# Patient Record
Sex: MALE | Race: WHITE | NOT HISPANIC OR LATINO | Employment: FULL TIME | URBAN - METROPOLITAN AREA
[De-identification: names, ages, dates, MRNs, and addresses within clinical notes are randomized per-mention and may not be internally consistent; named-entity substitution may affect disease eponyms.]

---

## 2018-09-11 LAB — HCV AB SER-ACNC: NONREACTIVE

## 2021-01-14 ENCOUNTER — OFFICE VISIT (OUTPATIENT)
Dept: FAMILY MEDICINE CLINIC | Facility: CLINIC | Age: 51
End: 2021-01-14
Payer: COMMERCIAL

## 2021-01-14 VITALS
HEIGHT: 72 IN | DIASTOLIC BLOOD PRESSURE: 88 MMHG | OXYGEN SATURATION: 98 % | WEIGHT: 253.4 LBS | BODY MASS INDEX: 34.32 KG/M2 | SYSTOLIC BLOOD PRESSURE: 120 MMHG | HEART RATE: 104 BPM | TEMPERATURE: 98.4 F

## 2021-01-14 DIAGNOSIS — Z11.4 ENCOUNTER FOR SCREENING FOR HIV: ICD-10-CM

## 2021-01-14 DIAGNOSIS — Z00.00 ENCOUNTER FOR WELL ADULT EXAM WITHOUT ABNORMAL FINDINGS: Primary | ICD-10-CM

## 2021-01-14 DIAGNOSIS — Z83.3 FAMILY HISTORY OF DIABETES MELLITUS: ICD-10-CM

## 2021-01-14 PROBLEM — M16.11 OSTEOARTHRITIS OF RIGHT HIP: Status: ACTIVE | Noted: 2021-01-14

## 2021-01-14 PROBLEM — B07.9 VERRUCA VULGARIS: Status: ACTIVE | Noted: 2021-01-14

## 2021-01-14 PROCEDURE — 3725F SCREEN DEPRESSION PERFORMED: CPT | Performed by: NURSE PRACTITIONER

## 2021-01-14 PROCEDURE — 3008F BODY MASS INDEX DOCD: CPT | Performed by: NURSE PRACTITIONER

## 2021-01-14 PROCEDURE — 4004F PT TOBACCO SCREEN RCVD TLK: CPT | Performed by: NURSE PRACTITIONER

## 2021-01-14 PROCEDURE — 99386 PREV VISIT NEW AGE 40-64: CPT | Performed by: NURSE PRACTITIONER

## 2021-01-14 RX ORDER — CELECOXIB 200 MG/1
200 CAPSULE ORAL 2 TIMES DAILY
COMMUNITY

## 2021-01-14 NOTE — PROGRESS NOTES
BMI Counseling: Body mass index is 34 37 kg/m²  The BMI is above normal  Nutrition recommendations include reducing portion sizes  Exercise recommendations include moderate aerobic physical activity for 150 minutes/week  ADULT ANNUAL [address] [facility]    NAME: Lashon Rosas  AGE: 52 y o  SEX: male  : 1970     DATE: 2021     Assessment and Plan:     Problem List Items Addressed This Visit        Other    Family history of diabetes mellitus    Relevant Orders    Hemoglobin A1C      Other Visit Diagnoses     Encounter for well adult exam without abnormal findings    -  Primary    Relevant Orders    Hemoglobin A1C    CBC and differential    Comprehensive metabolic panel    TSH, 3rd generation with Free T4 reflex    Lipid Panel with Direct LDL reflex    Encounter for screening for HIV        Relevant Orders    HIV 1/2 Antigen/Antibody (4th Generation) w Reflex [facility]          Immunizations and preventive care screenings were discussed with patient today  Appropriate education was printed on patient's after visit summary  Counseling:  Alcohol/drug use: discussed moderation in alcohol intake, the recommendations for healthy alcohol use, and avoidance of illicit drug use  Dental Health: discussed importance of regular tooth brushing, flossing, and dental visits  Injury prevention: discussed safety/seat belts, safety helmets, smoke detectors, carbon dioxide detectors, and smoking near bedding or upholstery  Sexual health: discussed sexually transmitted diseases, partner selection, use of condoms, avoidance of unintended pregnancy, and contraceptive alternatives  · Exercise: the importance of regular exercise/physical activity was discussed  Recommend exercise 3-5 times per week for at least 30 minutes  BMI Counseling: Body mass index is 34 37 kg/m²  The BMI is above normal  Nutrition recommendations include decreasing portion sizes  Exercise recommendations include moderate physical activity 150 minutes/week  Tobacco Cessation Counseling: Tobacco cessation counseling was provided  The patient is sincerely urged to quit consumption of tobacco  He is not ready to quit tobacco  occasional pipe       Return in about 1 year (around 1/14/2022)  Chief Complaint:     Chief Complaint   Patient presents with   [address]     pt is in office to est care    Annual Exam     pt would like annual exam      History of Present Illness:     Adult Annual Physical   Patient here for a comprehensive physical exam  The patient reports no problems  Diet and Physical Activity  · Diet/Nutrition: well balanced diet and avoids sweets, does eat larger portions at time   · Exercise: walking and 30minutes in am, does some weights twice weekly  Depression Screening  PHQ-9 Depression Screening    PHQ-9:   Frequency of the following problems over the past two weeks:      Little interest or pleasure in doing things: 0 - not at all  Feeling down, depressed, or hopeless: 0 - not at all  PHQ-2 Score: 0       General Health  · Sleep: sleeps well  · Hearing: normal - bilateral   · Vision: wears glasses  · Dental: every 6 months     Health  · Symptoms include: none     Review of Systems:     Review of Systems   Constitutional: Negative for activity change, chills, fatigue and fever  HENT: Negative for congestion, ear discharge, ear pain, sinus pressure, sinus pain, sore throat, tinnitus and trouble swallowing  Eyes: Negative for photophobia, pain, discharge, itching and visual disturbance  Respiratory: Negative for cough, chest tightness, shortness of breath and wheezing  Cardiovascular: Negative for chest pain and leg swelling  Gastrointestinal: Negative for abdominal distention, abdominal pain, constipation, diarrhea, nausea and vomiting  Endocrine: Negative for polydipsia, polyphagia and polyuria     Genitourinary: Negative for dysuria and frequency  Musculoskeletal: Negative for arthralgias, myalgias, neck pain and neck stiffness  Skin: Negative for color change  Neurological: Negative for dizziness, syncope, weakness, numbness and headaches  Hematological: Does not bruise/bleed easily  Psychiatric/Behavioral: Negative for behavioral problems, confusion, self-injury, sleep disturbance and suicidal ideas  The patient is not nervous/anxious         Past Medical History:     Past Medical History:   Diagnosis Date    Arthritis       Past Surgical History:     Past Surgical History:   Procedure Laterality Date    HIP SURGERY  2014      Family History:     Family History   Problem Relation Age of Onset    No Known Problems Mother     Hypertension Father     Asthma Father     Diabetes Father     Alzheimer's disease Father       Social History:     E-Cigarette/Vaping    E-Cigarette Use Never User      E-Cigarette/Vaping Substances    Nicotine No     THC No     CBD No     Flavoring No     Other No     Unknown No      Social History     Socioeconomic History    Marital status: Single     Spouse name: None    Number of children: None    Years of education: None    Highest education level: None   Occupational History    None   Social Needs    Financial resource strain: None    Food insecurity     Worry: None     Inability: None    Transportation needs     Medical: None     Non-medical: None   Tobacco Use    Smoking status: Current Some Day Smoker     Types: Pipe    Smokeless tobacco: Never Used   Substance and Sexual Activity    Alcohol use: Yes     Frequency: Monthly or less    Drug use: Never    Sexual activity: Yes   Lifestyle    Physical activity     Days per week: None     Minutes per session: None    Stress: None   Relationships    Social connections     Talks on phone: None     Gets together: None     Attends [employer] service: None     Active member of club or organization: None     Attends meetings of clubs or organizations: None     Relationship status: None    Intimate partner violence     Fear of current or ex partner: None     Emotionally abused: None     Physically abused: None     Forced sexual activity: None   Other Topics Concern    None   Social History Narrative    None      Current Medications:     Current Outpatient Medications   Medication Sig Dispense Refill    celecoxib (CeleBREX) 200 mg capsule Take 200 mg by mouth 2 (two) times a day       No current facility-administered medications for this visit  Allergies:     No Known Allergies   Physical Exam:     /88 (BP Location: Left arm, Patient Position: Sitting)   Pulse 104   Temp 98 4 °F (36 9 °C)   Ht 6' (1 829 m)   Wt 115 kg (253 lb 6 4 oz)   SpO2 98%   BMI 34 37 kg/m²     Physical Exam  Constitutional:       Appearance: Normal appearance  He is well-developed  HENT:      Head: Normocephalic and atraumatic  Right Ear: Tympanic membrane, ear canal and external ear normal       Left Ear: Tympanic membrane, ear canal and external ear normal       Nose: Nose normal  No congestion or rhinorrhea  Mouth/Throat:      Mouth: Mucous membranes are moist       Pharynx: No oropharyngeal exudate or posterior oropharyngeal erythema  Eyes:      General:         Right eye: No discharge  Left eye: No discharge  Conjunctiva/sclera: Conjunctivae normal       Pupils: Pupils are equal, round, and reactive to light  Neck:      Musculoskeletal: Normal range of motion and neck supple  Thyroid: No thyromegaly  Cardiovascular:      Rate and Rhythm: Normal rate and regular rhythm  Pulses: Normal pulses  Heart sounds: Normal heart sounds  Pulmonary:      Effort: Pulmonary effort is normal       Breath sounds: Normal breath sounds  No wheezing or rhonchi  Abdominal:      General: Bowel sounds are normal  There is no distension  Palpations: Abdomen is soft  Tenderness: There is no abdominal tenderness  Musculoskeletal: Normal range of motion  General: No swelling, tenderness or deformity  Right lower leg: No edema  Left lower leg: No edema  Skin:     General: Skin is warm and dry  Findings: No erythema or rash  Neurological:      General: No focal deficit present  Mental Status: He is alert and oriented to person, place, and time  Psychiatric:         Mood and Affect: Mood normal          Behavior: Behavior normal          Thought Content:  Thought content normal          Judgment: Judgment normal           Isabelle Carey, [address] [place] [address]

## 2021-01-18 LAB
ALBUMIN SERPL-MCNC: 4.3 G/DL (ref 4–5)
ALBUMIN/GLOB SERPL: 2 {RATIO} (ref 1.2–2.2)
ALP SERPL-CCNC: 51 IU/L (ref 39–117)
ALT SERPL-CCNC: 22 IU/L (ref 0–44)
AST SERPL-CCNC: 20 IU/L (ref 0–40)
BASOPHILS # BLD AUTO: 0.1 X10E3/UL (ref 0–0.2)
BASOPHILS NFR BLD AUTO: 1 %
BILIRUB SERPL-MCNC: 0.5 MG/DL (ref 0–1.2)
BUN SERPL-MCNC: 17 MG/DL (ref 6–24)
BUN/CREAT SERPL: 17 (ref 9–20)
CALCIUM SERPL-MCNC: 9.4 MG/DL (ref 8.7–10.2)
CHLORIDE SERPL-SCNC: 101 MMOL/L (ref 96–106)
CHOLEST SERPL-MCNC: 185 MG/DL (ref 100–199)
CO2 SERPL-SCNC: 26 MMOL/L (ref 20–29)
CREAT SERPL-MCNC: 0.99 MG/DL (ref 0.76–1.27)
EOSINOPHIL # BLD AUTO: 0.1 X10E3/UL (ref 0–0.4)
EOSINOPHIL NFR BLD AUTO: 2 %
ERYTHROCYTE [DISTWIDTH] IN BLOOD BY AUTOMATED COUNT: 11.6 % (ref 11.6–15.4)
GLOBULIN SER-MCNC: 2.2 G/DL (ref 1.5–4.5)
GLUCOSE SERPL-MCNC: 86 MG/DL (ref 65–99)
HBA1C MFR BLD: 5 % (ref 4.8–5.6)
HCT VFR BLD AUTO: 43.2 % (ref 37.5–51)
HDLC SERPL-MCNC: 54 MG/DL
HGB BLD-MCNC: 14.9 G/DL (ref 13–17.7)
HIV 1+2 AB+HIV1 P24 AG SERPL QL IA: NON REACTIVE
IMM GRANULOCYTES # BLD: 0 X10E3/UL (ref 0–0.1)
IMM GRANULOCYTES NFR BLD: 0 %
LDLC SERPL CALC-MCNC: 112 MG/DL (ref 0–99)
LYMPHOCYTES # BLD AUTO: 1.8 X10E3/UL (ref 0.7–3.1)
LYMPHOCYTES NFR BLD AUTO: 26 %
MCH RBC QN AUTO: 31.3 PG (ref 26.6–33)
MCHC RBC AUTO-ENTMCNC: 34.5 G/DL (ref 31.5–35.7)
MCV RBC AUTO: 91 FL (ref 79–97)
MONOCYTES # BLD AUTO: 0.7 X10E3/UL (ref 0.1–0.9)
MONOCYTES NFR BLD AUTO: 10 %
NEUTROPHILS # BLD AUTO: 4.2 X10E3/UL (ref 1.4–7)
NEUTROPHILS NFR BLD AUTO: 61 %
PLATELET # BLD AUTO: 266 X10E3/UL (ref 150–450)
POTASSIUM SERPL-SCNC: 4.5 MMOL/L (ref 3.5–5.2)
PROT SERPL-MCNC: 6.5 G/DL (ref 6–8.5)
RBC # BLD AUTO: 4.76 X10E6/UL (ref 4.14–5.8)
SL AMB EGFR AFRICAN AMERICAN: 104 ML/MIN/1.73
SL AMB EGFR NON AFRICAN AMERICAN: 90 ML/MIN/1.73
SL AMB VLDL CHOLESTEROL CALC: 19 MG/DL (ref 5–40)
SODIUM SERPL-SCNC: 139 MMOL/L (ref 134–144)
TRIGL SERPL-MCNC: 103 MG/DL (ref 0–149)
TSH SERPL DL<=0.005 MIU/L-ACNC: 1.45 UIU/ML (ref 0.45–4.5)
WBC # BLD AUTO: 6.9 X10E3/UL (ref 3.4–10.8)

## 2021-03-12 ENCOUNTER — TELEPHONE (OUTPATIENT)
Dept: DERMATOLOGY | Facility: CLINIC | Age: 51
End: 2021-03-12

## 2022-05-24 ENCOUNTER — OFFICE VISIT (OUTPATIENT)
Dept: FAMILY MEDICINE CLINIC | Facility: CLINIC | Age: 52
End: 2022-05-24
Payer: COMMERCIAL

## 2022-05-24 VITALS
BODY MASS INDEX: 32.53 KG/M2 | DIASTOLIC BLOOD PRESSURE: 80 MMHG | SYSTOLIC BLOOD PRESSURE: 138 MMHG | RESPIRATION RATE: 18 BRPM | TEMPERATURE: 99.4 F | HEIGHT: 72 IN | HEART RATE: 120 BPM | OXYGEN SATURATION: 97 % | WEIGHT: 240.2 LBS

## 2022-05-24 DIAGNOSIS — Z12.11 SCREEN FOR COLON CANCER: ICD-10-CM

## 2022-05-24 DIAGNOSIS — J20.8 ACUTE BACTERIAL BRONCHITIS: ICD-10-CM

## 2022-05-24 DIAGNOSIS — Z00.01 ENCOUNTER FOR WELL ADULT EXAM WITH ABNORMAL FINDINGS: Primary | ICD-10-CM

## 2022-05-24 DIAGNOSIS — Z12.5 SCREENING PSA (PROSTATE SPECIFIC ANTIGEN): ICD-10-CM

## 2022-05-24 DIAGNOSIS — B96.89 ACUTE BACTERIAL BRONCHITIS: ICD-10-CM

## 2022-05-24 PROCEDURE — 99396 PREV VISIT EST AGE 40-64: CPT | Performed by: NURSE PRACTITIONER

## 2022-05-24 PROCEDURE — 4004F PT TOBACCO SCREEN RCVD TLK: CPT | Performed by: NURSE PRACTITIONER

## 2022-05-24 PROCEDURE — 3725F SCREEN DEPRESSION PERFORMED: CPT | Performed by: NURSE PRACTITIONER

## 2022-05-24 PROCEDURE — 3008F BODY MASS INDEX DOCD: CPT | Performed by: NURSE PRACTITIONER

## 2022-05-24 RX ORDER — AZITHROMYCIN 250 MG/1
TABLET, FILM COATED ORAL
Qty: 6 TABLET | Refills: 0 | Status: SHIPPED | OUTPATIENT
Start: 2022-05-24 | End: 2022-05-28

## 2022-05-24 NOTE — PROGRESS NOTES
ADULT ANNUAL [address] [facility] PR[facility]    NAME: Elif Baumann  AGE: 54 y o  SEX: male  : 1970     DATE: 2022     Assessment and Plan:     Problem List Items Addressed This Visit    None     Visit Diagnoses     Encounter for well adult exam with abnormal findings    -  Primary    Relevant Orders    CBC and differential    Comprehensive metabolic panel    Lipid Panel with Direct LDL reflex    Screen for colon cancer        Relevant Orders    Cologuard    Screening PSA (prostate specific antigen)        Relevant Orders    PSA, total and free    Acute bacterial bronchitis        Relevant Medications    azithromycin (ZITHROMAX) 250 mg tablet          Immunizations and preventive care screenings were discussed with patient today  Appropriate education was printed on patient's after visit summary  Counseling:  Alcohol/drug use: discussed moderation in alcohol intake, the recommendations for healthy alcohol use, and avoidance of illicit drug use  Dental Health: discussed importance of regular tooth brushing, flossing, and dental visits  Injury prevention: discussed safety/seat belts, safety helmets, smoke detectors, carbon dioxide detectors, and smoking near bedding or upholstery  Sexual health: discussed sexually transmitted diseases, partner selection, use of condoms, avoidance of unintended pregnancy, and contraceptive alternatives  · Exercise: the importance of regular exercise/physical activity was discussed  Recommend exercise 3-5 times per week for at least 30 minutes  BMI Counseling: Body mass index is 32 58 kg/m²  The BMI is above normal  Nutrition recommendations include decreasing portion sizes  Exercise recommendations include moderate physical activity 150 minutes/week  Rationale for BMI follow-up plan is due to patient being overweight or obese       Depression Screening and Follow-up Plan: Patient was screened for depression during today's encounter  They screened negative with a PHQ-2 score of 0  Return in about 1 year (around 5/24/2023) for Annual physical      Chief Complaint:     Chief Complaint   Patient presents with    Cough     Negative 2 at home test , son had positive test        History of Present Illness:     Adult Annual Physical   Patient here for a comprehensive physical exam  The patient reports problems - chest cold  He reports a chest cold, he reports drainage, drip  He reports clearing his throat, cough at times  He had taken two covid tests and both negative and is vaccinated with a booster  He is taking Claritin D  Diet and Physical Activity  · Diet/Nutrition: well balanced diet  · Exercise: walking  Depression Screening  PHQ-2/9 Depression Screening    Little interest or pleasure in doing things: 0 - not at all  Feeling down, depressed, or hopeless: 0 - not at all  PHQ-2 Score: 0  PHQ-2 Interpretation: Negative depression screen       General Health  · Sleep: gets 7-8 hours of sleep on average  · Hearing: normal - bilateral   · Vision: wears glasses  · Dental: regular dental visits   Health  · Symptoms include: none     Review of Systems:     Review of Systems   Constitutional: Negative for activity change, chills, fatigue and fever  HENT: Positive for postnasal drip  Negative for congestion, ear discharge, ear pain, sinus pressure, sinus pain, sore throat, tinnitus and trouble swallowing  Eyes: Negative for photophobia, pain, discharge, itching and visual disturbance  Respiratory: Positive for cough  Negative for chest tightness, shortness of breath and wheezing  Cardiovascular: Negative for chest pain and leg swelling  Gastrointestinal: Negative for abdominal distention, abdominal pain, constipation, diarrhea, nausea and vomiting  Endocrine: Negative for polydipsia, polyphagia and polyuria  Genitourinary: Negative for dysuria and frequency     Musculoskeletal: Negative for arthralgias, myalgias, neck pain and neck stiffness  Skin: Negative for color change  Neurological: Negative for dizziness, syncope, weakness, numbness and headaches  Hematological: Does not bruise/bleed easily  Psychiatric/Behavioral: Negative for behavioral problems, confusion, self-injury, sleep disturbance and suicidal ideas  The patient is not nervous/anxious  Past Medical History:     Past Medical History:   Diagnosis Date    Arthritis       Past Surgical History:     Past Surgical History:   Procedure Laterality Date    HIP SURGERY  2014      Family History:     Family History   Problem Relation Age of Onset    No Known Problems Mother     Hypertension Father     Asthma Father     Diabetes Father     Alzheimer's disease Father       Social History:     Social History     Socioeconomic History    Marital status: Single     Spouse name: None    Number of children: None    Years of education: None    Highest education level: None   Occupational History    None   Tobacco Use    Smoking status: Current Some Day Smoker     Types: Pipe    Smokeless tobacco: Never Used   Vaping Use    Vaping Use: Never used   Substance and Sexual Activity    Alcohol use:  Yes    Drug use: Never    Sexual activity: Yes   Other Topics Concern    None   Social History Narrative    None     Social Determinants of Health     Financial Resource Strain: Not on file   Food Insecurity: Not on file   Transportation Needs: Not on file   Physical Activity: Not on file   Stress: Not on file   Social Connections: Not on file   Intimate Partner Violence: Not on file   Housing Stability: Not on file      Current Medications:     Current Outpatient Medications   Medication Sig Dispense Refill    azithromycin (ZITHROMAX) 250 mg tablet Take 2 tablets today then 1 tablet daily x 4 days 6 tablet 0    celecoxib (CeleBREX) 200 mg capsule Take 200 mg by mouth 2 (two) times a day       No current facility-administered medications for this visit  Allergies:     No Known Allergies   Physical Exam:     /80 (BP Location: Left arm, Patient Position: Sitting)   Pulse (!) 120   Temp 99 4 °F (37 4 °C)   Resp 18   Ht 6' (1 829 m)   Wt 109 kg (240 lb 3 2 oz)   SpO2 97%   BMI 32 58 kg/m²     Physical Exam  Vitals and nursing note reviewed  Constitutional:       Appearance: He is obese  HENT:      Head: Normocephalic and atraumatic  Nose: Nose normal    Cardiovascular:      Rate and Rhythm: Normal rate and regular rhythm  Pulses: Normal pulses  Heart sounds: Normal heart sounds  Pulmonary:      Effort: Pulmonary effort is normal       Breath sounds: Normal breath sounds  Neurological:      General: No focal deficit present  Mental Status: He is alert and oriented to person, place, and time  Psychiatric:         Mood and Affect: Mood normal          Behavior: Behavior normal           YANELY Matute  [facility] Hospital[facility] Counseling: Body mass index is 32 58 kg/m²  The BMI is above normal  Nutrition recommendations include reducing portion sizes  Exercise recommendations include moderate aerobic physical activity for 150 minutes/week

## 2022-06-07 LAB — COLOGUARD RESULT REPORTABLE: POSITIVE

## 2022-06-08 DIAGNOSIS — R19.5 POSITIVE COLORECTAL CANCER SCREENING USING COLOGUARD TEST: Primary | ICD-10-CM

## 2022-06-08 NOTE — RESULT ENCOUNTER NOTE
Pt has a positive cologuard, a referral for GI was placed, please make sure he is notified and an appt is captured

## 2022-06-27 ENCOUNTER — TELEPHONE (OUTPATIENT)
Dept: GASTROENTEROLOGY | Facility: CLINIC | Age: 52
End: 2022-06-27

## 2022-06-27 ENCOUNTER — PREP FOR PROCEDURE (OUTPATIENT)
Dept: GASTROENTEROLOGY | Facility: CLINIC | Age: 52
End: 2022-06-27

## 2022-06-27 DIAGNOSIS — R19.5 POSITIVE COLORECTAL CANCER SCREENING USING COLOGUARD TEST: Primary | ICD-10-CM

## 2022-06-27 NOTE — TELEPHONE ENCOUNTER
06/27/22  Screened by: Cristina Bourgeois    Referring Provider Veronica Salter    Pre- Screening: There is no height or weight on file to calculate BMI  Has patient been referred for a routine screening Colonoscopy? yes  Is the patient between 39-70 years old? yes      Previous Colonoscopy no   If yes:    Date:     Facility:     Reason:       SCHEDULING STAFF: If the patient is between 45yrs-49yrs, please advise patient to confirm benefits/coverage with their insurance company for a routine screening colonoscopy, some insurance carriers will only cover at [address] or older  If the patient is over 66years old, please schedule an office visit  Does the patient want to see a Gastroenterologist prior to their procedure OR are they having any GI symptoms? no    Has the patient been hospitalized or had abdominal surgery in the past 6 months? no    Does the patient use supplemental oxygen? no    Does the patient take Coumadin, Lovenox, Plavix, Elliquis, Xarelto, or other blood thinning medication? no    Has the patient had a stroke, cardiac event, or stent placed in the past year? no    SCHEDULING STAFF: If patient answers NO to above questions, then schedule procedure  If patient answers YES to above questions, then schedule office appointment  If patient is between 45yrs - 49yrs, please advise patient that we will have to confirm benefits & coverage with their insurance company for a routine screening colonoscopy

## 2022-08-10 ENCOUNTER — ANESTHESIA (OUTPATIENT)
Dept: GASTROENTEROLOGY | Facility: HOSPITAL | Age: 52
End: 2022-08-10

## 2022-08-10 ENCOUNTER — HOSPITAL ENCOUNTER (OUTPATIENT)
Dept: GASTROENTEROLOGY | Facility: HOSPITAL | Age: 52
Setting detail: OUTPATIENT SURGERY
Discharge: HOME | End: 2022-08-10
Attending: INTERNAL MEDICINE | Admitting: INTERNAL MEDICINE
Payer: COMMERCIAL

## 2022-08-10 ENCOUNTER — ANESTHESIA EVENT (OUTPATIENT)
Dept: GASTROENTEROLOGY | Facility: HOSPITAL | Age: 52
End: 2022-08-10

## 2022-08-10 VITALS
SYSTOLIC BLOOD PRESSURE: 124 MMHG | HEIGHT: 72 IN | WEIGHT: 231.92 LBS | BODY MASS INDEX: 31.41 KG/M2 | HEART RATE: 71 BPM | TEMPERATURE: 97.7 F | OXYGEN SATURATION: 94 % | RESPIRATION RATE: 16 BRPM | DIASTOLIC BLOOD PRESSURE: 79 MMHG

## 2022-08-10 DIAGNOSIS — R19.5 POSITIVE COLORECTAL CANCER SCREENING USING COLOGUARD TEST: ICD-10-CM

## 2022-08-10 PROCEDURE — 88305 TISSUE EXAM BY PATHOLOGIST: CPT | Performed by: PATHOLOGY

## 2022-08-10 PROCEDURE — 45385 COLONOSCOPY W/LESION REMOVAL: CPT | Performed by: INTERNAL MEDICINE

## 2022-08-10 RX ORDER — PROPOFOL 10 MG/ML
INJECTION, EMULSION INTRAVENOUS AS NEEDED
Status: DISCONTINUED | OUTPATIENT
Start: 2022-08-10 | End: 2022-08-10

## 2022-08-10 RX ORDER — MELATONIN
2000 DAILY
COMMUNITY

## 2022-08-10 RX ORDER — CHLORAL HYDRATE 500 MG
1000 CAPSULE ORAL DAILY
COMMUNITY

## 2022-08-10 RX ORDER — DIPHENOXYLATE HYDROCHLORIDE AND ATROPINE SULFATE 2.5; .025 MG/1; MG/1
1 TABLET ORAL DAILY
COMMUNITY

## 2022-08-10 RX ORDER — LIDOCAINE HYDROCHLORIDE 10 MG/ML
INJECTION, SOLUTION EPIDURAL; INFILTRATION; INTRACAUDAL; PERINEURAL AS NEEDED
Status: DISCONTINUED | OUTPATIENT
Start: 2022-08-10 | End: 2022-08-10

## 2022-08-10 RX ORDER — SODIUM CHLORIDE, SODIUM LACTATE, POTASSIUM CHLORIDE, CALCIUM CHLORIDE 600; 310; 30; 20 MG/100ML; MG/100ML; MG/100ML; MG/100ML
INJECTION, SOLUTION INTRAVENOUS CONTINUOUS PRN
Status: DISCONTINUED | OUTPATIENT
Start: 2022-08-10 | End: 2022-08-10

## 2022-08-10 RX ADMIN — PROPOFOL 20 MG: 10 INJECTION, EMULSION INTRAVENOUS at 10:09

## 2022-08-10 RX ADMIN — LIDOCAINE HYDROCHLORIDE 20 MG: 10 INJECTION, SOLUTION EPIDURAL; INFILTRATION; INTRACAUDAL at 09:57

## 2022-08-10 RX ADMIN — PROPOFOL 20 MG: 10 INJECTION, EMULSION INTRAVENOUS at 10:03

## 2022-08-10 RX ADMIN — PROPOFOL 20 MG: 10 INJECTION, EMULSION INTRAVENOUS at 10:05

## 2022-08-10 RX ADMIN — PROPOFOL 20 MG: 10 INJECTION, EMULSION INTRAVENOUS at 09:59

## 2022-08-10 RX ADMIN — PROPOFOL 20 MG: 10 INJECTION, EMULSION INTRAVENOUS at 10:01

## 2022-08-10 RX ADMIN — PROPOFOL 20 MG: 10 INJECTION, EMULSION INTRAVENOUS at 10:07

## 2022-08-10 RX ADMIN — SODIUM CHLORIDE, SODIUM LACTATE, POTASSIUM CHLORIDE, AND CALCIUM CHLORIDE: .6; .31; .03; .02 INJECTION, SOLUTION INTRAVENOUS at 09:45

## 2022-08-10 RX ADMIN — PROPOFOL 120 MG: 10 INJECTION, EMULSION INTRAVENOUS at 09:57

## 2022-08-10 NOTE — INTERVAL H&P NOTE
H&P reviewed  After examining the patient I find no changes in the patients condition since the H&P had been written      Vitals:    08/10/22 0917   BP: 143/80   Pulse: 72   Resp: 18   Temp: 98 3 °F (36 8 °C)   SpO2: 96%

## 2022-08-10 NOTE — H&P
History and Physical -  [facility]  Brenda Caruso 54 y o  male MRN: [id number]                  HPI: Brenda Caruso is a 54y o  year old male who presents for colonoscopy for positive Cologuard test   No prior colonoscopy      REVIEW OF SYSTEMS: Per the HPI, and otherwise unremarkable  Historical Information   Past Medical History:   Diagnosis Date    Arthritis      Past Surgical History:   Procedure Laterality Date    HIP SURGERY  2014     Social History   Social History     Substance and Sexual Activity   Alcohol Use Yes     Social History     Substance and Sexual Activity   Drug Use Never     Social History     Tobacco Use   Smoking Status Current Some Day Smoker    Types: Pipe   Smokeless Tobacco Never Used     Family History   Problem Relation Age of Onset    No Known Problems Mother     Hypertension Father     Asthma Father     Diabetes Father     Alzheimer's disease Father        Meds/Allergies     (Not in a hospital admission)      No Known Allergies    Objective     There were no vitals taken for this visit        PHYSICAL EXAM    Gen: NAD  CV: RRR  CHEST: Clear  ABD: soft, NT/ND  EXT: no edema  Neuro: AAO      ASSESSMENT/PLAN:  This is a 54y o  year old male here for positive Cologuard    PLAN:   Procedure:  Colonoscopy

## 2022-08-10 NOTE — ANESTHESIA PREPROCEDURE EVALUATION
Procedure:  COLONOSCOPY    Relevant Problems   MUSCULOSKELETAL   (+) Osteoarthritis of right hip      bmi 31 25  Physical Exam    Airway    Mallampati score: II  TM Distance: >3 FB  Neck ROM: full     Dental   No notable dental hx     Cardiovascular  Cardiovascular exam normal    Pulmonary  Pulmonary exam normal     Other Findings        Anesthesia Plan  ASA Score- 2     Anesthesia Type- IV sedation with anesthesia with ASA Monitors  Additional Monitors:   Airway Plan:           Plan Factors-Exercise tolerance (METS): >4 METS  Chart reviewed  EKG reviewed  Imaging results reviewed  Existing labs reviewed  Patient summary reviewed  Patient is not a current smoker  Induction- intravenous  Postoperative Plan-     Informed Consent- Anesthetic plan and risks discussed with patient  I personally reviewed this patient with the CRNA  Discussed and agreed on the Anesthesia Plan with the CRNA  Gege Mercado

## 2022-08-16 LAB
ALBUMIN SERPL-MCNC: 4.5 G/DL (ref 4–5)
ALBUMIN/GLOB SERPL: 2 {RATIO} (ref 1.2–2.2)
ALP SERPL-CCNC: 48 IU/L (ref 44–121)
ALT SERPL-CCNC: 15 IU/L (ref 0–44)
AST SERPL-CCNC: 17 IU/L (ref 0–40)
BASOPHILS # BLD AUTO: 0.1 X10E3/UL (ref 0–0.2)
BASOPHILS NFR BLD AUTO: 1 %
BILIRUB SERPL-MCNC: 0.6 MG/DL (ref 0–1.2)
BUN SERPL-MCNC: 18 MG/DL (ref 6–24)
BUN/CREAT SERPL: 18 (ref 9–20)
CALCIUM SERPL-MCNC: 9.8 MG/DL (ref 8.7–10.2)
CHLORIDE SERPL-SCNC: 100 MMOL/L (ref 96–106)
CHOLEST SERPL-MCNC: 182 MG/DL (ref 100–199)
CO2 SERPL-SCNC: 25 MMOL/L (ref 20–29)
CREAT SERPL-MCNC: 1.01 MG/DL (ref 0.76–1.27)
EGFR: 91 ML/MIN/1.73
EOSINOPHIL # BLD AUTO: 0.2 X10E3/UL (ref 0–0.4)
EOSINOPHIL NFR BLD AUTO: 3 %
ERYTHROCYTE [DISTWIDTH] IN BLOOD BY AUTOMATED COUNT: 11.6 % (ref 11.6–15.4)
GLOBULIN SER-MCNC: 2.3 G/DL (ref 1.5–4.5)
GLUCOSE SERPL-MCNC: 92 MG/DL (ref 65–99)
HCT VFR BLD AUTO: 43.1 % (ref 37.5–51)
HDLC SERPL-MCNC: 50 MG/DL
HGB BLD-MCNC: 14.9 G/DL (ref 13–17.7)
IMM GRANULOCYTES # BLD: 0 X10E3/UL (ref 0–0.1)
IMM GRANULOCYTES NFR BLD: 0 %
LDLC SERPL CALC-MCNC: 109 MG/DL (ref 0–99)
LYMPHOCYTES # BLD AUTO: 2.1 X10E3/UL (ref 0.7–3.1)
LYMPHOCYTES NFR BLD AUTO: 28 %
MCH RBC QN AUTO: 31.8 PG (ref 26.6–33)
MCHC RBC AUTO-ENTMCNC: 34.6 G/DL (ref 31.5–35.7)
MCV RBC AUTO: 92 FL (ref 79–97)
MONOCYTES # BLD AUTO: 0.8 X10E3/UL (ref 0.1–0.9)
MONOCYTES NFR BLD AUTO: 11 %
NEUTROPHILS # BLD AUTO: 4.4 X10E3/UL (ref 1.4–7)
NEUTROPHILS NFR BLD AUTO: 57 %
PLATELET # BLD AUTO: 300 X10E3/UL (ref 150–450)
POTASSIUM SERPL-SCNC: 4.3 MMOL/L (ref 3.5–5.2)
PROT SERPL-MCNC: 6.8 G/DL (ref 6–8.5)
PSA FREE MFR SERPL: 35 %
PSA FREE SERPL-MCNC: 0.14 NG/ML
PSA SERPL-MCNC: 0.4 NG/ML (ref 0–4)
RBC # BLD AUTO: 4.69 X10E6/UL (ref 4.14–5.8)
SL AMB VLDL CHOLESTEROL CALC: 23 MG/DL (ref 5–40)
SODIUM SERPL-SCNC: 139 MMOL/L (ref 134–144)
TRIGL SERPL-MCNC: 130 MG/DL (ref 0–149)
WBC # BLD AUTO: 7.6 X10E3/UL (ref 3.4–10.8)

## 2022-08-25 ENCOUNTER — TELEPHONE (OUTPATIENT)
Dept: GASTROENTEROLOGY | Facility: AMBULARY SURGERY CENTER | Age: 52
End: 2022-08-25

## 2022-08-25 NOTE — TELEPHONE ENCOUNTER
Patients GI provider:  Dr Georgina Negron     Number to return call: [phone]       Reason for call: Pt calling to get results of colonoscopy done on 8/10/22     Scheduled procedure/appointment date if applicable: Apt/procedure n/a

## 2023-01-20 ENCOUNTER — OFFICE VISIT (OUTPATIENT)
Dept: URGENT CARE | Facility: CLINIC | Age: 53
End: 2023-01-20

## 2023-01-20 VITALS
SYSTOLIC BLOOD PRESSURE: 120 MMHG | OXYGEN SATURATION: 95 % | DIASTOLIC BLOOD PRESSURE: 80 MMHG | RESPIRATION RATE: 16 BRPM | TEMPERATURE: 97.1 F | HEART RATE: 108 BPM

## 2023-01-20 DIAGNOSIS — R50.9 FEVER, UNSPECIFIED: Primary | ICD-10-CM

## 2023-01-20 DIAGNOSIS — J02.9 ACUTE PHARYNGITIS, UNSPECIFIED ETIOLOGY: ICD-10-CM

## 2023-01-20 DIAGNOSIS — R55 SYNCOPE, UNSPECIFIED SYNCOPE TYPE: ICD-10-CM

## 2023-01-20 LAB — S PYO AG THROAT QL: NEGATIVE

## 2023-01-20 NOTE — PROGRESS NOTES
[facility] [employer] [facility]        NAME: Js Ragland is a 50 y o  male  : 1970    MRN: [id number]  DATE: 2023  TIME: 12:23 PM    Assessment and Plan   Fever, unspecified [R50 9]  1  Fever, unspecified        2  Acute pharyngitis, unspecified etiology  POCT rapid strepA    Throat culture      3  Syncope, unspecified syncope type  ECG 12 lead        12 lead showing normal sinus rhythm  Negative strep testing  Concerning history given syncope - neuro exam normal, MSK exam normal  It seems unlikely that he hit his head to the ground given that he found it on a toilet paper roll  No current dizziness symptoms  No cardiac or pulmonary history  No signs of seizure  Discussed emergency room visit with him which would be able to do more of a workup  In mutual discussion okay to go home but follow up with family doctor, potentially cardiology, in the next 3-5 days  Educated if any return in dizziness, worse headaches, mental status change to go straight to the ER for further workup  Follow up with primary care in 3-5 days  Go to ER if symptoms get worse  Patient Instructions     Start on Tylenol for fever, chills  Stay hydrated and rest as able  Get up slowly from changes in position  Follow up with regular doctor in next 3-5 days  Educated if any return in dizziness, worse headaches, mental status change to go straight to the ER for further workup  Chief Complaint     Chief Complaint   Patient presents with   • Sore Throat     C/o sore throat, chills, body aches, and "thinks I may have passed out"  States he found himself on the floor after getting out of bed  Indicates this has happened to him in the past when he had strep throat  At home covid test - Negative  Taking tylenol  Symptoms started on wed  History of Present Illness       Presents with sick symptoms including sore throat, fever, chills, and body aches that began 2 days ago   Last night he got out of bed to go to the bathroom  After urinating he was washing his heads he turned quickly, and next thing woke up on the floor with the toilet paper role underneath his head  He took a minute to figure out what happened, then he was able to get up  No new pain  Headache still comes and goes but started before he fell  He does have mild back pain but this is intermittent and comes and goes chronically  He did pass out once  before when he had strep about 6-7 years ago  Denies cardiac or pulmonary history  Denies dizziness, nausea, vomiting, or change in mental status  He has been taking Tylenol for symptoms  At home COVID test is negative  He was alone yesterday but told he has his children staying at the house  Denies current dizziness or weakness  He does report that he did get up frequently to urinate  Review of Systems   Review of Systems   Constitutional: Positive for chills, fatigue and fever  HENT: Positive for sore throat  Negative for ear pain  Eyes: Negative for visual disturbance  Respiratory: Negative for cough and shortness of breath  Cardiovascular: Negative for chest pain and palpitations  Gastrointestinal: Negative for diarrhea, nausea and vomiting  Anal bleeding: chronic  Genitourinary: Positive for frequency  Negative for dysuria, flank pain, hematuria and urgency  Musculoskeletal: Positive for back pain and myalgias  Skin: Negative for color change and rash  Neurological: Positive for syncope and headaches  Negative for dizziness and weakness  Psychiatric/Behavioral: Negative for confusion  All other systems reviewed and are negative          Current Medications       Current Outpatient Medications:   •  celecoxib (CeleBREX) 200 mg capsule, Take 200 mg by mouth 2 (two) times a day, Disp: , Rfl:   •  cholecalciferol (VITAMIN D3) 1,000 units tablet, Take 2,000 Units by mouth daily, Disp: , Rfl:   •  multivitamin (THERAGRAN) TABS, Take 1 tablet by mouth daily, Disp: , Rfl:   •  Omega-3 Fatty Acids (fish oil) 1,000 mg, Take 1,000 mg by mouth daily, Disp: , Rfl:     Current Allergies     Allergies as of 01/20/2023   • (No Known Allergies)            The following portions of the patient's history were reviewed and updated as appropriate: allergies, current medications, past family history, past medical history, past social history, past surgical history and problem list      Past Medical History:   Diagnosis Date   • Arthritis        Past Surgical History:   Procedure Laterality Date   • HIP SURGERY  2014       Family History   Problem Relation Age of Onset   • No Known Problems Mother    • Hypertension Father    • Asthma Father    • Diabetes Father    • Alzheimer's disease Father          Medications have been verified  Objective   /80 (BP Location: Left arm, Patient Position: Sitting, Cuff Size: Large)   Pulse (!) 108   Temp (!) 97 1 °F (36 2 °C) (Oral)   Resp 16   SpO2 95%        Physical Exam     Physical Exam  Vitals reviewed  Constitutional:       General: He is not in acute distress  Appearance: Normal appearance  HENT:      Right Ear: Tympanic membrane, ear canal and external ear normal  Tympanic membrane is not erythematous  Left Ear: Tympanic membrane, ear canal and external ear normal  Tympanic membrane is not erythematous  Nose: Nose normal       Mouth/Throat:      Mouth: Mucous membranes are moist       Pharynx: Posterior oropharyngeal erythema present  Tonsils: 1+ on the right  1+ on the left  Eyes:      Conjunctiva/sclera: Conjunctivae normal    Cardiovascular:      Rate and Rhythm: Normal rate and regular rhythm  Pulses: Normal pulses  Heart sounds: Normal heart sounds  No murmur heard  Pulmonary:      Effort: Pulmonary effort is normal  No respiratory distress  Breath sounds: Normal breath sounds     Musculoskeletal:      Cervical back: Normal       Thoracic back: Normal       Lumbar back: Normal    Skin:     General: Skin is warm and dry  Neurological:      General: No focal deficit present  Mental Status: He is alert and oriented to person, place, and time  GCS: GCS eye subscore is 4  GCS verbal subscore is 5  GCS motor subscore is 6  Cranial Nerves: Cranial nerves 2-12 are intact  Sensory: Sensation is intact  Motor: Motor function is intact  Coordination: Coordination is intact  Gait: Gait is intact   Gait normal    Psychiatric:         Mood and Affect: Mood normal          Behavior: Behavior normal

## 2023-01-20 NOTE — LETTER
January 20, 2023     Jade Miller, [address]  [facility] [address]    Patient: Ramesh Kendrick   YOB: 1970   Date of Visit: 1/20/2023        Dear YANELY Velarde:    Your patient, Ramesh Kendrick was seen in our urgent care department on 1/20/2023  Enclosed is a full report of that visit  He did have syncope, with flu symptoms present  Negative EKG and normal blood pressure, normal neuro exam  Will need follow up regarding  Thank you  If you have any questions or concerns, please don't hesitate to call             Sincerely,        YANELY Crabtree      CC: [unfilled]    Enclosure

## 2023-01-20 NOTE — PATIENT INSTRUCTIONS
Start on Tylenol for fever, chills  Stay hydrated and rest as able  Get up slowly from changes in position  Follow up with regular doctor in next 3-5 days  Educated if any return in dizziness, worse headaches, mental status change to go straight to the ER for further workup

## 2023-01-22 LAB — BACTERIA THROAT CULT: ABNORMAL

## 2023-01-24 NOTE — RESULT ENCOUNTER NOTE
Your throat culture grew step C  This infection will go away on its own  If you are still having sore throat symptoms, give the office a call back at [phone] to discuss potential medication options  I hope you are feeling better!

## 2023-05-30 ENCOUNTER — OFFICE VISIT (OUTPATIENT)
Dept: FAMILY MEDICINE CLINIC | Facility: CLINIC | Age: 53
End: 2023-05-30

## 2023-05-30 VITALS
DIASTOLIC BLOOD PRESSURE: 80 MMHG | SYSTOLIC BLOOD PRESSURE: 128 MMHG | TEMPERATURE: 97.5 F | HEART RATE: 84 BPM | OXYGEN SATURATION: 96 % | WEIGHT: 245.4 LBS | HEIGHT: 72 IN | BODY MASS INDEX: 33.24 KG/M2

## 2023-05-30 DIAGNOSIS — Z12.5 SCREENING PSA (PROSTATE SPECIFIC ANTIGEN): ICD-10-CM

## 2023-05-30 DIAGNOSIS — M16.11 PRIMARY OSTEOARTHRITIS OF RIGHT HIP: ICD-10-CM

## 2023-05-30 DIAGNOSIS — Z00.00 ANNUAL PHYSICAL EXAM: Primary | ICD-10-CM

## 2023-05-30 DIAGNOSIS — M16.0 PRIMARY OSTEOARTHRITIS OF BOTH HIPS: ICD-10-CM

## 2023-05-30 RX ORDER — CELECOXIB 200 MG/1
200 CAPSULE ORAL DAILY
Qty: 60 CAPSULE | Refills: 2 | Status: SHIPPED | OUTPATIENT
Start: 2023-05-30

## 2023-05-30 NOTE — PROGRESS NOTES
ADULT ANNUAL [address] [facility]    NAME: Tricia Tidwell  AGE: 50 y o  SEX: male  : 1970     DATE: 2023     Assessment and Plan:     Problem List Items Addressed This Visit        Musculoskeletal and Integument    Osteoarthritis of both hips     Was prescribed celebrex by ortho in [place]  Will renew           Relevant Medications    celecoxib (CeleBREX) 200 mg capsule   Other Visit Diagnoses     Annual physical exam    -  Primary    Relevant Orders    CBC and differential    Comprehensive metabolic panel    Lipid Panel with Direct LDL reflex    Screening PSA (prostate specific antigen)        Relevant Orders    PSA, total and free          Immunizations and preventive care screenings were discussed with patient today  Appropriate education was printed on patient's after visit summary  Counseling:  Alcohol/drug use: discussed moderation in alcohol intake, the recommendations for healthy alcohol use, and avoidance of illicit drug use  Dental Health: discussed importance of regular tooth brushing, flossing, and dental visits  Injury prevention: discussed safety/seat belts, safety helmets, smoke detectors, carbon dioxide detectors, and smoking near bedding or upholstery  Sexual health: discussed sexually transmitted diseases, partner selection, use of condoms, avoidance of unintended pregnancy, and contraceptive alternatives  · Exercise: the importance of regular exercise/physical activity was discussed  Recommend exercise 3-5 times per week for at least 30 minutes  Return in about 1 year (around 2024)  Chief Complaint:     Chief Complaint   Patient presents with   • Physical Exam     Pt presents to the office for annual physical   Pt reports joint pain mainly in hips       History of Present Illness:     Adult Annual Physical   Patient here for a comprehensive physical exam  The patient reports problems - hip pain   under the care of ortho in [place] , was told he needed a hip replacement, too young at present per patient    Diet and Physical Activity  · Diet/Nutrition: well balanced diet  · Exercise: walking  Depression Screening  PHQ-2/9 Depression Screening    Little interest or pleasure in doing things: 0 - not at all  Feeling down, depressed, or hopeless: 0 - not at all  PHQ-2 Score: 0  PHQ-2 Interpretation: Negative depression screen       General Health  · Sleep: gets 7-8 hours of sleep on average  · Hearing: normal - bilateral   · Vision: wears glasses  · Dental: regular dental visits   Health  · Symptoms include: none     Review of Systems:     Review of Systems   Constitutional: Negative for activity change, chills, fatigue and fever  HENT: Negative for congestion, ear discharge, ear pain, sinus pressure, sinus pain, sore throat, tinnitus and trouble swallowing  Eyes: Negative for photophobia, pain, discharge, itching and visual disturbance  Respiratory: Negative for cough, chest tightness, shortness of breath and wheezing  Cardiovascular: Negative for chest pain and leg swelling  Gastrointestinal: Negative for abdominal distention, abdominal pain, constipation, diarrhea, nausea and vomiting  Endocrine: Negative for polydipsia, polyphagia and polyuria  Genitourinary: Negative for dysuria and frequency  Musculoskeletal: Positive for arthralgias  Negative for myalgias, neck pain and neck stiffness  Skin: Negative for color change  Neurological: Negative for dizziness, syncope, weakness, numbness and headaches  Hematological: Does not bruise/bleed easily  Psychiatric/Behavioral: Negative for behavioral problems, confusion, self-injury, sleep disturbance and suicidal ideas  The patient is not nervous/anxious         Past Medical History:     Past Medical History:   Diagnosis Date   • Arthritis       Past Surgical History:     Past Surgical History:   Procedure Laterality Date   • HIP SURGERY  2014      Family History:     Family History   Problem Relation Age of Onset   • No Known Problems Mother    • Hypertension Father    • Asthma Father    • Diabetes Father    • Alzheimer's disease Father       Social History:     Social History     Socioeconomic History   • Marital status: Single     Spouse name: None   • Number of children: None   • Years of education: None   • Highest education level: None   Occupational History   • None   Tobacco Use   • Smoking status: Some Days     Types: Pipe   • Smokeless tobacco: Never   Vaping Use   • Vaping Use: Never used   Substance and Sexual Activity   • Alcohol use: Yes     Alcohol/week: 2 0 standard drinks of alcohol     Types: 2 Cans of beer per week     Comment: weekly   • Drug use: Never   • Sexual activity: Yes   Other Topics Concern   • None   Social History Narrative   • None     Social Determinants of Health     Financial Resource Strain: Not on file   Food Insecurity: Not on file   Transportation Needs: Not on file   Physical Activity: Not on file   Stress: Not on file   Social Connections: Not on file   Intimate Partner Violence: Not on file   Housing Stability: Not on file      Current Medications:     Current Outpatient Medications   Medication Sig Dispense Refill   • celecoxib (CeleBREX) 200 mg capsule Take 1 capsule (200 mg total) by mouth daily 60 capsule 2   • cholecalciferol (VITAMIN D3) 1,000 units tablet Take 2,000 Units by mouth daily     • multivitamin (THERAGRAN) TABS Take 1 tablet by mouth daily     • Omega-3 Fatty Acids (fish oil) 1,000 mg Take 1,000 mg by mouth daily       No current facility-administered medications for this visit  Allergies:     No Known Allergies   Physical Exam:     /80 (BP Location: Left arm, Patient Position: Sitting)   Pulse 84   Temp 97 5 °F (36 4 °C)   Ht 6' (1 829 m)   Wt 111 kg (245 lb 6 4 oz)   SpO2 96%   BMI 33 28 kg/m²     Physical Exam  Vitals reviewed     Constitutional: Appearance: Normal appearance  He is well-developed  HENT:      Head: Normocephalic and atraumatic  Right Ear: Tympanic membrane, ear canal and external ear normal       Left Ear: Tympanic membrane, ear canal and external ear normal       Nose: Nose normal  No congestion or rhinorrhea  Mouth/Throat:      Mouth: Mucous membranes are moist       Pharynx: No oropharyngeal exudate or posterior oropharyngeal erythema  Eyes:      General:         Right eye: No discharge  Left eye: No discharge  Conjunctiva/sclera: Conjunctivae normal       Pupils: Pupils are equal, round, and reactive to light  Neck:      Thyroid: No thyromegaly  Cardiovascular:      Rate and Rhythm: Normal rate and regular rhythm  Pulses: Normal pulses  Heart sounds: Normal heart sounds  Pulmonary:      Effort: Pulmonary effort is normal       Breath sounds: Normal breath sounds  No wheezing or rhonchi  Abdominal:      General: Bowel sounds are normal  There is no distension  Palpations: Abdomen is soft  Tenderness: There is no abdominal tenderness  Musculoskeletal:         General: No swelling, tenderness or deformity  Normal range of motion  Cervical back: Normal range of motion and neck supple  Right lower leg: No edema  Left lower leg: No edema  Skin:     General: Skin is warm and dry  Findings: No erythema or rash  Neurological:      General: No focal deficit present  Mental Status: He is alert and oriented to person, place, and time  Psychiatric:         Mood and Affect: Mood normal          Behavior: Behavior normal          Thought Content:  Thought content normal          Judgment: Judgment normal           Isabelle Carey, [address] [facility]

## 2023-09-28 LAB
ALBUMIN SERPL-MCNC: 4.4 G/DL (ref 3.8–4.9)
ALBUMIN/GLOB SERPL: 2.1 {RATIO} (ref 1.2–2.2)
ALP SERPL-CCNC: 45 IU/L (ref 44–121)
ALT SERPL-CCNC: 26 IU/L (ref 0–44)
AST SERPL-CCNC: 20 IU/L (ref 0–40)
BASOPHILS # BLD AUTO: 0.1 X10E3/UL (ref 0–0.2)
BASOPHILS NFR BLD AUTO: 1 %
BILIRUB SERPL-MCNC: 0.7 MG/DL (ref 0–1.2)
BUN SERPL-MCNC: 15 MG/DL (ref 6–24)
BUN/CREAT SERPL: 15 (ref 9–20)
CALCIUM SERPL-MCNC: 9.8 MG/DL (ref 8.7–10.2)
CHLORIDE SERPL-SCNC: 101 MMOL/L (ref 96–106)
CHOLEST SERPL-MCNC: 192 MG/DL (ref 100–199)
CO2 SERPL-SCNC: 23 MMOL/L (ref 20–29)
CREAT SERPL-MCNC: 1 MG/DL (ref 0.76–1.27)
EGFR: 91 ML/MIN/1.73
EOSINOPHIL # BLD AUTO: 0.1 X10E3/UL (ref 0–0.4)
EOSINOPHIL NFR BLD AUTO: 2 %
ERYTHROCYTE [DISTWIDTH] IN BLOOD BY AUTOMATED COUNT: 11.5 % (ref 11.6–15.4)
GLOBULIN SER-MCNC: 2.1 G/DL (ref 1.5–4.5)
GLUCOSE SERPL-MCNC: 87 MG/DL (ref 70–99)
HCT VFR BLD AUTO: 44 % (ref 37.5–51)
HDLC SERPL-MCNC: 51 MG/DL
HGB BLD-MCNC: 14.9 G/DL (ref 13–17.7)
IMM GRANULOCYTES # BLD: 0 X10E3/UL (ref 0–0.1)
IMM GRANULOCYTES NFR BLD: 0 %
LDLC SERPL CALC-MCNC: 110 MG/DL (ref 0–99)
LYMPHOCYTES # BLD AUTO: 1.9 X10E3/UL (ref 0.7–3.1)
LYMPHOCYTES NFR BLD AUTO: 27 %
MCH RBC QN AUTO: 31.2 PG (ref 26.6–33)
MCHC RBC AUTO-ENTMCNC: 33.9 G/DL (ref 31.5–35.7)
MCV RBC AUTO: 92 FL (ref 79–97)
MONOCYTES # BLD AUTO: 0.6 X10E3/UL (ref 0.1–0.9)
MONOCYTES NFR BLD AUTO: 9 %
NEUTROPHILS # BLD AUTO: 4.4 X10E3/UL (ref 1.4–7)
NEUTROPHILS NFR BLD AUTO: 61 %
PLATELET # BLD AUTO: 297 X10E3/UL (ref 150–450)
POTASSIUM SERPL-SCNC: 4.3 MMOL/L (ref 3.5–5.2)
PROT SERPL-MCNC: 6.5 G/DL (ref 6–8.5)
PSA FREE MFR SERPL: 37.5 %
PSA FREE SERPL-MCNC: 0.15 NG/ML
PSA SERPL-MCNC: 0.4 NG/ML (ref 0–4)
RBC # BLD AUTO: 4.77 X10E6/UL (ref 4.14–5.8)
SL AMB VLDL CHOLESTEROL CALC: 31 MG/DL (ref 5–40)
SODIUM SERPL-SCNC: 139 MMOL/L (ref 134–144)
TRIGL SERPL-MCNC: 176 MG/DL (ref 0–149)
WBC # BLD AUTO: 7.2 X10E3/UL (ref 3.4–10.8)

## 2023-09-30 NOTE — RESULT ENCOUNTER NOTE
Please let him know I reviewed his lab, they are all normal except for cholesterol. Advise low fat foods and routine exercise

## 2023-11-28 DIAGNOSIS — M16.11 PRIMARY OSTEOARTHRITIS OF RIGHT HIP: ICD-10-CM

## 2023-11-28 RX ORDER — CELECOXIB 200 MG/1
200 CAPSULE ORAL DAILY
Qty: 30 CAPSULE | Refills: 5 | Status: SHIPPED | OUTPATIENT
Start: 2023-11-28

## 2023-12-23 DIAGNOSIS — M16.11 PRIMARY OSTEOARTHRITIS OF RIGHT HIP: ICD-10-CM

## 2023-12-24 RX ORDER — CELECOXIB 200 MG/1
200 CAPSULE ORAL DAILY
Qty: 90 CAPSULE | Refills: 2 | Status: SHIPPED | OUTPATIENT
Start: 2023-12-24

## 2024-03-20 ENCOUNTER — OFFICE VISIT (OUTPATIENT)
Dept: OBGYN CLINIC | Facility: CLINIC | Age: 54
End: 2024-03-20
Payer: COMMERCIAL

## 2024-03-20 VITALS
SYSTOLIC BLOOD PRESSURE: 126 MMHG | HEART RATE: 88 BPM | WEIGHT: 246.2 LBS | HEIGHT: 72 IN | DIASTOLIC BLOOD PRESSURE: 85 MMHG | TEMPERATURE: 98.6 F | BODY MASS INDEX: 33.35 KG/M2

## 2024-03-20 DIAGNOSIS — M54.2 CERVICALGIA: ICD-10-CM

## 2024-03-20 DIAGNOSIS — M54.12 CERVICAL RADICULOPATHY: Primary | ICD-10-CM

## 2024-03-20 PROCEDURE — 99204 OFFICE O/P NEW MOD 45 MIN: CPT | Performed by: FAMILY MEDICINE

## 2024-03-20 NOTE — PROGRESS NOTES
[facility] [address] [place]  [facility] [id number] [phone]      Assessment:  1. Cervical radiculopathy  -     XR shoulder 2+ vw right; Future; Expected date: 03/20/2024  -     Ambulatory Referral to Physical Therapy; Future  -     XR spine cervical 2 or 3 vw injury; Future; Expected date: 03/20/2024    2. Cervicalgia  -     Ambulatory Referral to Physical Therapy; Future  -     XR spine cervical 2 or 3 vw injury; Future; Expected date: 03/20/2024        Plan:  Patient Instructions   F/u 6 wks  Begin physical therapy  Home exercises.  Icing/heat/OTC pain meds as needed.     Return in about 6 weeks (around 5/1/2024) for Recheck.    Chief Complaint:  Chief Complaint   Patient presents with    Right Shoulder - Pain       Subjective:   HPI    Patient ID: Danis Forde is a 53 y.o. male     Here c/o R shoulder pain/neck pain  Pain started about 2-3 wks ago  Can't remember any injury- works at desk with computers  Neck is stiff- pain looking up  Constant achey pain  Denies n/t  Maybe radiates from neck to shoulder  Advil PRN- helps, celebrex at night PRN  Trouble sleeping due to pain      Review of Systems   Constitutional:  Negative for fatigue and fever.   Respiratory:  Negative for shortness of breath.    Cardiovascular:  Negative for chest pain.   Gastrointestinal:  Negative for abdominal pain and nausea.   Genitourinary:  Negative for dysuria.   Musculoskeletal:  Positive for arthralgias and neck pain.   Skin:  Negative for rash and wound.   Neurological:  Negative for weakness and headaches.       Objective:  Vitals:  /85 (BP Location: Left arm, Patient Position: Sitting, Cuff Size: Standard)   Pulse 88   Temp 98.6 °F (37 °C) (Tympanic)   Ht 6' (1.829 m)   Wt 112 kg (246 lb 3.2 oz)   BMI 33.39 kg/m²     The following portions of the patient's history were reviewed and updated as appropriate: allergies, current medications, past family history, past  medical history, past social history, past surgical history, and problem list.    Physical exam:  Physical Exam  Constitutional:       Appearance: Normal appearance. He is normal weight.   Eyes:      Extraocular Movements: Extraocular movements intact.   Pulmonary:      Effort: Pulmonary effort is normal.   Musculoskeletal:      Cervical back: Normal range of motion.   Skin:     General: Skin is warm and dry.   Neurological:      General: No focal deficit present.      Mental Status: He is alert and oriented to person, place, and time. Mental status is at baseline.   Psychiatric:         Mood and Affect: Mood normal.         Behavior: Behavior normal.         Thought Content: Thought content normal.         Judgment: Judgment normal.       Back Exam     Tenderness   The patient is experiencing no tenderness.     Range of Motion   The patient has normal back ROM.    Comments:  Pain with ext/rot B/lat flex R  Pos spurling            XR  C spine- reviewed by me.  No fx. Mild DJD      Troy Cueto MD

## 2024-03-26 ENCOUNTER — EVALUATION (OUTPATIENT)
Dept: PHYSICAL THERAPY | Age: 54
End: 2024-03-26
Payer: COMMERCIAL

## 2024-03-26 DIAGNOSIS — M54.2 CERVICALGIA: ICD-10-CM

## 2024-03-26 DIAGNOSIS — M54.12 CERVICAL RADICULOPATHY: ICD-10-CM

## 2024-03-26 PROCEDURE — 97140 MANUAL THERAPY 1/> REGIONS: CPT | Performed by: PHYSICAL THERAPIST

## 2024-03-26 PROCEDURE — 97110 THERAPEUTIC EXERCISES: CPT | Performed by: PHYSICAL THERAPIST

## 2024-03-26 PROCEDURE — 97161 PT EVAL LOW COMPLEX 20 MIN: CPT | Performed by: PHYSICAL THERAPIST

## 2024-03-26 NOTE — PROGRESS NOTES
PT Evaluation     Today's date: 3/26/2024  Patient name: Danis Forde  : 1970  MRN: [id number]  Referring provider: Troy Cueto MD  Dx:   Encounter Diagnosis     ICD-10-CM    1. Cervical radiculopathy  M54.12 Ambulatory Referral to Physical Therapy      2. Cervicalgia  M54.2 Ambulatory Referral to Physical Therapy          Start Time: 1505  Stop Time: 1600  Total time in clinic (min): 55 minutes    Assessment  Assessment details: Problem list:  1.  Cervical hypomobility limiting extension, right sidebending ,right rotation  2.  Poor posture    Danis Forde is a pleasant 53-year-old male who presents to physical therapy today with a 3-week history of right-sided neck pain and right shoulder upper arm pain.  He has cervical hypomobility lacking extension, right sidebending, right rotation indicating a closing restriction and producing the pain he is experiencing.  He does have postural changes including decreased cervical lordosis prominence of the CT junction and increased thoracic kyphosis.  There are no neurologic signs and no evidence of weakness.  At this time no referral appears necessary and I expect the patient will respond favorably to physical therapy with mobility exercises and postural education.    Comparable signs  1.  Cervical extension  2.  Right cervical rotation  3.  Sleeping tolerance  Impairments: abnormal or restricted ROM, lacks appropriate home exercise program, pain with function and poor posture   Functional limitations: Looking up, rotating right, sleeping on left sideUnderstanding of Dx/Px/POC: good   Prognosis: good  Prognosis details: Positive prognostic indicators:  Patient is a willing participant, patient has a positive attitude towards recovery and physical therapy, patient has a good understanding of diagnosis and treatment options, no observed red flags  Negative prognostic indicators:  None    Goals  Short-term goals:  1.  Independent home exercise program for daily  performance  2.  Restore full active pain-free range of motion of the cervical spine  Long-term goals:  1.  Patient will be pain-free for ADLs/IADLs and driving.  2.  Patient will be able to sleep without waking due to pain.  3.  Patient will be able to manage symptoms independently.    Plan  Plan details: Patient was a agreeable to stated plan of care.  Patient would benefit from: skilled physical therapy  Planned modality interventions: traction  Planned therapy interventions: joint mobilization, manual therapy, patient education, therapeutic activities, therapeutic exercise, strengthening, stretching, graded exercise, home exercise program, postural training and functional ROM exercises  Frequency: 2x week  Duration in visits: 12  Plan of Care beginning date: 3/26/2024  Plan of Care expiration date: 6/24/2024  Treatment plan discussed with: patient        Subjective Evaluation    History of Present Illness  Mechanism of injury: Pt presents with insidious onset of neck and right arm with progressive worsening 3 weeks. He denies numbness or tingling.    He reports difficulty sleeping and finding position of comfort but denies waking due to neck pain.   Pt did take celebrex prior to bed with some relief.   The pain is worse when lying on left side.  Pt does work on computer and feels better at times when he takes his right arm behind his head.    He is not limited per say in function but has pain with looking up and slightly with driving. It does not affect his job.    The patient's greatest concern is that it is affecting his sleep and he would like to be able to sleep to without pain.   Pt has had prior PT for his neck in 2018 with relief and has a positive outlook with PT.  Patient Goals  Patient goals for therapy: decreased pain and increased motion    Pain  Location: right neck referred to reyes upper arm  Quality: dull ache and sharp  Relieving factors: medications  Exacerbated by: nilsa on left, looking  up, turning to right.    Hand dominance: right          Objective     Concurrent Complaints      Additional Special Questions  No red flags     Static Posture     Shoulders  Rounded.    Thoracic Spine  Hyperkyphosis.    Comments  Decreased cervical lordosis with slight prominence of CT junction    Neurological Testing     Sensation   Cervical/Thoracic   Left   Intact: light touch and pin prick    Right   Intact: light touch and pin prick    Reflexes   Left   Biceps (C5/C6): normal (2+)  Brachioradialis (C6): normal (2+)  Triceps (C7): normal (2+)    Right   Biceps (C5/C6): normal (2+)  Brachioradialis (C6): normal (2+)  Triceps (C7): normal (2+)    Active Range of Motion   Cervical/Thoracic Spine       Cervical    Flexion:  WFL  Extension:  Restriction level: moderate  Left lateral flexion:  Restriction level: minimal  Right lateral flexion:  Restriction level moderate  Left rotation:  WFL  Right rotation:  Restriction level: minimal    Thoracic    Flexion:  WFL  Extension:  Restriction level: minimal  Left lateral flexion:  WFL  Right lateral flexion:  WFL  Left rotation:  Restriction level: moderate  Right rotation:  WFL    Strength/Myotome Testing     Left Shoulder     Planes of Motion   Flexion: 5   Abduction: 5     Isolated Muscles   Lower trapezius: 4-   Middle trapezius: 4-     Right Shoulder     Planes of Motion   Flexion: 5   Abduction: 5     Isolated Muscles   Lower trapezius: 4-   Middle trapezius: 4-     Left Elbow   Flexion: 5  Extension: 5    Right Elbow   Flexion: 5  Extension: 5    Left Wrist/Hand   Wrist extension: 5  Wrist flexion: 5    Right Wrist/Hand   Wrist extension: 5  Wrist flexion: 5    Tests   Cervical   Positive vertical compression, cervical distraction test and neck flexor muscle endurance test.    Left   Positive Spurling's Test A.     Right   Positive Spurling's Test A.     Left Shoulder   Negative ULTT1.     Right Shoulder   Negative ULTT1.     Lumbar   Positive vertical compression  .     Additional Tests Details  DNF 20 seconds             POC expires Unit limit Auth Expiration date PT/OT + Visit Limit?   6/24/24 4  60 PT/OT/ [facility]/yr                           Visit/Unit Tracking  AUTH Status:  Date 3/26              No Auth Used 1               Remaining  59               FOTO 54 (68)                    Precautions: standard    Access Code: [email or web address]  URL: [email or web address]  Date: 03/26/2024  Prepared by: Billie Calhoun    Exercises  - Seated Assisted Cervical Rotation with Towel  - 1 x daily - 7 x weekly - 3 sets - 10 reps  - Mid-Lower Cervical Extension SNAG with Strap  - 1 x daily - 7 x weekly - 3 sets - 10 reps    Manuals 3/26            Manual distraction MH            Upper trap stretch MH                         TherEx             HEP/pt ed 5            SNAG rot 10x ea            SNAG ext 10x                         Pulleys w/Cervical rot             Throacic ext              Open book             Neckslevel rot             Neckslevel retraction/ext                          Tband row             Tband Shoulder ext                                       procedure             Mech traction                                                                              Gait Training                                       Modalities

## 2024-03-29 ENCOUNTER — OFFICE VISIT (OUTPATIENT)
Dept: PHYSICAL THERAPY | Age: 54
End: 2024-03-29
Payer: COMMERCIAL

## 2024-03-29 DIAGNOSIS — M54.2 CERVICALGIA: ICD-10-CM

## 2024-03-29 DIAGNOSIS — M54.12 CERVICAL RADICULOPATHY: Primary | ICD-10-CM

## 2024-03-29 PROCEDURE — 97110 THERAPEUTIC EXERCISES: CPT

## 2024-03-29 PROCEDURE — 97140 MANUAL THERAPY 1/> REGIONS: CPT

## 2024-03-29 PROCEDURE — 97012 MECHANICAL TRACTION THERAPY: CPT

## 2024-03-29 NOTE — PROGRESS NOTES
"Daily Note     Today's date: 3/29/2024  Patient name: Danis Forde  : 1970  MRN: [id number]  Referring provider: Troy Cueto MD  Dx:   Encounter Diagnosis     ICD-10-CM    1. Cervical radiculopathy  M54.12       2. Cervicalgia  M54.2           Start Time: 1425  Stop Time: 1515  Total time in clinic (min): 50 minutes    Subjective: Reports being a little stiff after last session, but no significant increase in pain. \"I think it is getting a little better.\"       Objective: See treatment diary below      Assessment: Tolerated treatment well with no c/o radicular sx with the exception of thoracic ext which caused 3/10 pain into his R shoulder. Discontinued this activity after 3 reps. Good form noted with SNAGs with few cues needed for proper dosage. Initiated mechanical traction today with parameters below. No c/o elevated pain or production of sx during or post. Reduced stiffness. Patient would benefit from continued PT.      Plan: Continue per plan of care.      POC expires Unit limit Auth Expiration date PT/OT + Visit Limit?   24 4  60 PT/OT/ [facility]/yr                           Visit/Unit Tracking  AUTH Status:  Date 3/26 3/29             No Auth Used 1 2              Remaining  59 58              FOTO 54 (68)                    Precautions: standard    Access Code: [email or web address]  URL: [email or web address]  Date: 2024  Prepared by: Billie Calhoun    Exercises  - Seated Assisted Cervical Rotation with Towel  - 1 x daily - 7 x weekly - 3 sets - 10 reps  - Mid-Lower Cervical Extension SNAG with Strap  - 1 x daily - 7 x weekly - 3 sets - 10 reps    Manuals 3/26 3/29           Manual distraction  JR           Upper trap stretch  JR                         TherEx             HEP/pt ed 5            SNAG rot 10x ea 10x ea           SNAG ext 10x 10x                         Pulleys w/Cervical rot  20x            Throacic ext   10x            Open book  3x *pain           Neckslevel rot  1'   "          Neckslevel retraction/ext  NV                         Tband row  BTB 20x           Tband Shoulder ext  BTB 20x                                      procedure             Mech traction  20# static 10'                                                                             Gait Training                                       Modalities

## 2024-04-02 ENCOUNTER — OFFICE VISIT (OUTPATIENT)
Dept: PHYSICAL THERAPY | Age: 54
End: 2024-04-02
Payer: COMMERCIAL

## 2024-04-02 DIAGNOSIS — M54.2 CERVICALGIA: ICD-10-CM

## 2024-04-02 DIAGNOSIS — M54.12 CERVICAL RADICULOPATHY: Primary | ICD-10-CM

## 2024-04-02 PROCEDURE — 97110 THERAPEUTIC EXERCISES: CPT | Performed by: PHYSICAL THERAPIST

## 2024-04-02 PROCEDURE — 97140 MANUAL THERAPY 1/> REGIONS: CPT | Performed by: PHYSICAL THERAPIST

## 2024-04-02 PROCEDURE — 97012 MECHANICAL TRACTION THERAPY: CPT | Performed by: PHYSICAL THERAPIST

## 2024-04-02 NOTE — PROGRESS NOTES
"Daily Note     Today's date: 2024  Patient name: Danis Forde  : 1970  MRN: [id number]  Referring provider: Troy Cueto MD  Dx:   Encounter Diagnosis     ICD-10-CM    1. Cervical radiculopathy  M54.12       2. Cervicalgia  M54.2           Start Time: 1420  Stop Time: 1510  Total time in clinic (min): 50 minutes    Subjective: Pt reports he feels improvement. He is not taking celebrex anymore to help him sleep and is sleeping better and less pain.       Objective: See treatment diary below      Assessment: Pt has decreased symptom irritability. Positive response to treatment.       Plan: Continue per plan of care.      POC expires Unit limit Auth Expiration date PT/OT + Visit Limit?   24 4  60 PT/OT/ [facility]/yr                           Visit/Unit Tracking  AUTH Status:  Date 3/26 3/29 4/2            No Auth Used 1 2 3             Remaining  59 58 57             FOTO 54 (68)                    Precautions: standard    Access Code: [email or web address]  URL: [email or web address]  Date: 2024  Prepared by: Billie Calhoun    Exercises  - Seated Assisted Cervical Rotation with Towel  - 1 x daily - 7 x weekly - 3 sets - 10 reps  - Mid-Lower Cervical Extension SNAG with Strap  - 1 x daily - 7 x weekly - 3 sets - 10 reps    Manuals 3/26 3/29 4/2          Manual distraction [facility]          Upper trap stretch [facility]                       TherEx             HEP/pt ed 5            SNAG rot 10x ea 10x ea 10x          SNAG ext 10x 10x  10x ea                       Pulleys w/Cervical rot  20x  30x          Throacic ext   10x            Open book  3x *pain 15x          Neckslevel rot  1'  30x ea Yellow          Neckslevel retraction/ext  NV  20x3\" yellow                       Tband row  BTB 20x 30x          Tband Shoulder ext  BTB 20x  30x                                    procedure             Mech traction  20# static 10'  21# static 10'                                                               "             Gait Training                                       Modalities

## 2024-04-05 ENCOUNTER — OFFICE VISIT (OUTPATIENT)
Dept: PHYSICAL THERAPY | Age: 54
End: 2024-04-05
Payer: COMMERCIAL

## 2024-04-05 DIAGNOSIS — M54.12 CERVICAL RADICULOPATHY: Primary | ICD-10-CM

## 2024-04-05 DIAGNOSIS — M54.2 CERVICALGIA: ICD-10-CM

## 2024-04-05 PROCEDURE — 97110 THERAPEUTIC EXERCISES: CPT

## 2024-04-05 PROCEDURE — 97140 MANUAL THERAPY 1/> REGIONS: CPT

## 2024-04-05 PROCEDURE — 97012 MECHANICAL TRACTION THERAPY: CPT

## 2024-04-05 NOTE — PROGRESS NOTES
"Daily Note     Today's date: 2024  Patient name: Danis Forde  : 1970  MRN: [id number]  Referring provider: Troy Cueto MD  Dx:   Encounter Diagnosis     ICD-10-CM    1. Cervical radiculopathy  M54.12       2. Cervicalgia  M54.2           Start Time: 1430  Stop Time: 1514  Total time in clinic (min): 44 minutes    Subjective: Reports feeling a lot better since starting PT.       Objective: See treatment diary below      Assessment: Good recall of his program. Some cues for carryover of program. No c/o impingement with B UT stretching. Patient would benefit from continued PT      Plan: Continue per plan of care.      POC expires Unit limit Auth Expiration date PT/OT + Visit Limit?   24 4  60 PT/OT/ [facility]/yr                           Visit/Unit Tracking  AUTH Status:  Date 3/26 3/29 4/2 4/5            No Auth Used 1 2 3 4            Remaining  59 58 57 56            FOTO 54 (68)                    Precautions: standard    Access Code: [email or web address]  URL: [email or web address]  Date: 2024  Prepared by: Billie Calhoun    Exercises  - Seated Assisted Cervical Rotation with Towel  - 1 x daily - 7 x weekly - 3 sets - 10 reps  - Mid-Lower Cervical Extension SNAG with Strap  - 1 x daily - 7 x weekly - 3 sets - 10 reps    Manuals 3/26 3/29 4/2 4/5          Manual distraction  JR  JR         Upper trap stretch  JR   JR                       TherEx             HEP/pt ed 5            SNAG rot 10x ea 10x ea 10x 10x ea         SNAG ext 10x 10x  10x ea 10x                       Pulleys w/Cervical rot  20x  30x 30x          Throacic ext   10x   10x         Open book  3x *pain 15x 15x ea         Neckslevel rot  1'  30x ea Yellow 30x ea Yellow         Neckslevel retraction/ext  NV  20x3\" yellow 20x3\" yellow                      Tband row  BTB 20x 30x BTB 30x         Tband Shoulder ext  BTB 20x  30x BTB 30x                                    procedure             Mech traction  20# static 10'  " 21# static 10' 21# static 10'                                                                          Gait Training                                       Modalities

## 2024-04-09 ENCOUNTER — OFFICE VISIT (OUTPATIENT)
Dept: PHYSICAL THERAPY | Age: 54
End: 2024-04-09
Payer: COMMERCIAL

## 2024-04-09 DIAGNOSIS — M54.2 CERVICALGIA: ICD-10-CM

## 2024-04-09 DIAGNOSIS — M54.12 CERVICAL RADICULOPATHY: Primary | ICD-10-CM

## 2024-04-09 PROCEDURE — 97012 MECHANICAL TRACTION THERAPY: CPT | Performed by: PHYSICAL THERAPIST

## 2024-04-09 PROCEDURE — 97110 THERAPEUTIC EXERCISES: CPT | Performed by: PHYSICAL THERAPIST

## 2024-04-09 NOTE — PROGRESS NOTES
"Daily Note     Today's date: 2024  Patient name: Danis Forde  : 1970  MRN: [id number]  Referring provider: Troy Cueto MD  Dx:   Encounter Diagnosis     ICD-10-CM    1. Cervical radiculopathy  M54.12       2. Cervicalgia  M54.2           Start Time: 1115  Stop Time: 1200  Total time in clinic (min): 45 minutes    Subjective: Pt reports improvement in pain levels. He reports no real pain today just residual stiffness.      Objective: See treatment diary below      Assessment: Increased mechanical traction to 22# with good tolerance. Pt responds well to Rx with decreased symptom irritability and improved mobility        Plan: Continue per plan of care.      POC expires Unit limit Auth Expiration date PT/OT + Visit Limit?   24 4  60 PT/OT/ ST [facility]/yr                           Visit/Unit Tracking  AUTH Status:  Date 3/26 3/29 4/2 4/5  4/9          No Auth Used 1 2 3 4 5           Remaining  59 58 57 56 55           FOTO 54 (68)                    Precautions: standard    Access Code: [email or web address]  URL: [email or web address]  Date: 2024  Prepared by: Billie Calhoun    Exercises  - Seated Assisted Cervical Rotation with Towel  - 1 x daily - 7 x weekly - 3 sets - 10 reps  - Mid-Lower Cervical Extension SNAG with Strap  - 1 x daily - 7 x weekly - 3 sets - 10 reps    Manuals 3/26 3/29 4/2 4/5  4/9        Manual distraction [facility]        Upper trap stretch [facility]                     TherEx             HEP/pt ed 5            SNAG rot 10x ea 10x ea 10x 10x ea 10x        SNAG ext 10x 10x  10x ea 10x  10x                     Pulleys w/Cervical rot  20x  30x 30x  30x        Throacic ext   10x   10x 10x        Open book  3x *pain 15x 15x ea 15x ea         Neckslevel rot  1'  30x ea Yellow 30x ea Yellow Green 30x        Neckslevel retraction/ext  NV  20x3\" yellow 20x3\" yellow Green 30x                     Tband row  BTB 20x 30x BTB 30x 30x        Tband Shoulder ext  BTB 20x  30x " BTB 30x  30x                                  procedure             Mech traction  20# static 10'  21# static 10' 21# static 10' 22#/  10'                                                                         Gait Training                                       Modalities

## 2024-04-11 ENCOUNTER — OFFICE VISIT (OUTPATIENT)
Dept: PHYSICAL THERAPY | Age: 54
End: 2024-04-11
Payer: COMMERCIAL

## 2024-04-11 DIAGNOSIS — M54.2 CERVICALGIA: ICD-10-CM

## 2024-04-11 DIAGNOSIS — M54.12 CERVICAL RADICULOPATHY: Primary | ICD-10-CM

## 2024-04-11 PROCEDURE — 97110 THERAPEUTIC EXERCISES: CPT

## 2024-04-11 PROCEDURE — 97140 MANUAL THERAPY 1/> REGIONS: CPT

## 2024-04-11 NOTE — PROGRESS NOTES
Daily Note     Today's date: 2024  Patient name: Danis Forde  : 1970  MRN: [id number]  Referring provider: Troy Cueto MD  Dx:   Encounter Diagnosis     ICD-10-CM    1. Cervical radiculopathy  M54.12       2. Cervicalgia  M54.2           Start Time: 1515  Stop Time: 1600  Total time in clinic (min): 45 minutes    Subjective: Patient reports he has no pain in his neck area, plus no more radiating on RUE.  Patient c/o continued tightness in cervical area with turning to the right.      Objective: See treatment diary below      Assessment: Tolerated treatment well.   Patient participated in skilled PT session focused on strengthening, stretching, and ROM.  Patient able to complete exercise program with no issues.  Patient demonstrates improved ROM rotation to both R/L, but some increased tightness in R paraspinals.  Held  traction this session and will discuss at next session.  Patient would continue to benefit from skilled PT interventions to address strengthening, stretching, and ROM. Patient demonstrated fatigue post treatment and exhibited good technique with therapeutic exercises      Plan: Continue per plan of care.      POC expires Unit limit Auth Expiration date PT/OT + Visit Limit?   24 4  60 PT/OT/ ST [facility]/yr                           Visit/Unit Tracking  AUTH Status:  Date 3/26 3/29 4/2 4/5  4/9 4/11         No Auth Used 1 2 3 4 5 6          Remaining  59 58 57 56 55 54          FOTO 54 (68)                    Precautions: standard    Access Code: [email or web address]  URL: [email or web address]  Date: 2024  Prepared by: Billie Calhoun    Exercises  - Seated Assisted Cervical Rotation with Towel  - 1 x daily - 7 x weekly - 3 sets - 10 reps  - Mid-Lower Cervical Extension SNAG with Strap  - 1 x daily - 7 x weekly - 3 sets - 10 reps    Manuals 3/26 3/29 4/2 4/5  4/9 4/11       Manual distraction [facility]       Upper trap stretch [facility]                   "  TherEx             HEP/pt ed 5            SNAG rot 10x ea 10x ea 10x 10x ea 10x 10x       SNAG ext 10x 10x  10x ea 10x  10x 10x                    Pulleys w/Cervical rot  20x  30x 30x  30x 30x       Throacic ext   10x   10x 10x 10x       Open book  3x *pain 15x 15x ea 15x ea  15x ea       Neckslevel rot  1'  30x ea Yellow 30x ea Yellow Green 30x Green 30x       Neckslevel retraction/ext  NV  20x3\" yellow 20x3\" yellow Green 30x Green 30x                    Tband row  BTB 20x 30x BTB 30x 30x 30x  Blue TB       Tband Shoulder ext  BTB 20x  30x BTB 30x  30x 30x Blue TB                                 procedure             Mech traction  20# static 10'  21# static 10' 21# static 10' 22#/  10' Held today                                                                        Gait Training                                       Modalities                                                    "

## 2024-04-16 ENCOUNTER — OFFICE VISIT (OUTPATIENT)
Dept: PHYSICAL THERAPY | Age: 54
End: 2024-04-16
Payer: COMMERCIAL

## 2024-04-16 DIAGNOSIS — M54.2 CERVICALGIA: ICD-10-CM

## 2024-04-16 DIAGNOSIS — M54.12 CERVICAL RADICULOPATHY: Primary | ICD-10-CM

## 2024-04-16 PROCEDURE — 97012 MECHANICAL TRACTION THERAPY: CPT | Performed by: PHYSICAL THERAPIST

## 2024-04-16 PROCEDURE — 97110 THERAPEUTIC EXERCISES: CPT | Performed by: PHYSICAL THERAPIST

## 2024-04-16 PROCEDURE — 97140 MANUAL THERAPY 1/> REGIONS: CPT | Performed by: PHYSICAL THERAPIST

## 2024-04-16 NOTE — PROGRESS NOTES
"Daily Note     Today's date: 2024  Patient name: Danis Forde  : 1970  MRN: [id number]  Referring provider: Troy Cueto MD  Dx:   Encounter Diagnosis     ICD-10-CM    1. Cervical radiculopathy  M54.12       2. Cervicalgia  M54.2           Start Time: 1600  Stop Time: 1650  Total time in clinic (min): 50 minutes    Subjective: Pt reports increased pain right scap likely due to carrying backpack around [place] while traveling. Pain 6/10 (had been decreased to 2/10).  Pt session decrease in pain.       Objective: See treatment diary below.      Assessment: Resumed mechanical traction due to increase in pain. Pt responded well to session today with decreased pain post-session.     Plan: Continue per plan of care.      POC expires Unit limit Auth Expiration date PT/OT + Visit Limit?   24 4  60 PT/OT/ ST [facility]/yr                           Visit/Unit Tracking  AUTH Status:  Date 3/26 3/29 4/2 4/5  4/9 4/11 4/16        No Auth Used 1 2 3 4 5 6 7         Remaining  59 58 57 56 55 54 53         FOTO 54 (68)                    Precautions: standard    Access Code: [email or web address]  URL: [email or web address]  Date: 2024  Prepared by: Billie Calhoun    Exercises  - Seated Assisted Cervical Rotation with Towel  - 1 x daily - 7 x weekly - 3 sets - 10 reps  - Mid-Lower Cervical Extension SNAG with Strap  - 1 x daily - 7 x weekly - 3 sets - 10 reps    Manuals 3/26 3/29 4/2 4/5  4/9 4/11 4/16      Manual distraction [facility]      Upper trap stretch [facility]                   TherEx             HEP/pt ed 5            SNAG rot 10x ea 10x ea 10x 10x ea 10x 10x       SNAG ext 10x 10x  10x ea 10x  10x 10x                    Pulleys w/Cervical rot  20x  30x 30x  30x 30x 30x      Throacic ext   10x   10x 10x 10x 10x       Open book  3x *pain 15x 15x ea 15x ea  15x ea 15x      Neckslevel rot  1'  30x ea Yellow 30x ea Yellow Green 30x Green 30x 15x 3\" hold      Neckslevel AROM       30x " "     Neckslevel retraction/ext  NV  20x3\" yellow 20x3\" yellow Green 30x Green 30x 30x green                   Tband row  BTB 20x 30x BTB 30x 30x 30x  Blue TB 30x      Tband Shoulder ext  BTB 20x  30x BTB 30x  30x 30x Blue TB 30x                                procedure             Mech traction  20# static 10'  21# static 10' 21# static 10' 22#/  10' Held today 22#/ 10'                                                                       Gait Training                                       Modalities                                                      "

## 2024-04-19 ENCOUNTER — OFFICE VISIT (OUTPATIENT)
Dept: PHYSICAL THERAPY | Age: 54
End: 2024-04-19
Payer: COMMERCIAL

## 2024-04-19 DIAGNOSIS — M54.12 CERVICAL RADICULOPATHY: Primary | ICD-10-CM

## 2024-04-19 DIAGNOSIS — M54.2 CERVICALGIA: ICD-10-CM

## 2024-04-19 PROCEDURE — 97110 THERAPEUTIC EXERCISES: CPT

## 2024-04-19 PROCEDURE — 97140 MANUAL THERAPY 1/> REGIONS: CPT

## 2024-04-19 PROCEDURE — 97012 MECHANICAL TRACTION THERAPY: CPT

## 2024-04-19 NOTE — PROGRESS NOTES
Daily Note     Today's date: 2024  Patient name: Danis Forde  : 1970  MRN: [id number]  Referring provider: Troy Cueto MD  Dx:   Encounter Diagnosis     ICD-10-CM    1. Cervical radiculopathy  M54.12       2. Cervicalgia  M54.2           Start Time: 1345  Stop Time: 1435  Total time in clinic (min): 50 minutes    Subjective: Patient reports his neck is starting to feel better since his trip.  Patient reports more soreness with head turning than any pain.      Objective: See treatment diary below      Assessment: Tolerated treatment well.   Patient participated in skilled PT session focused on strengthening, stretching, and ROM.  Patient able to complete exercise program with no issues.  Increased wt with mechanical cervical traction to 23#, which patient had no adverse reaction.  Patient experiences an decrease in soreness and increased ROM with rotation after mechanical traction.  Patient would continue to benefit from skilled PT interventions to address strengthening, stretching, and ROM. Patient demonstrated fatigue post treatment and exhibited good technique with therapeutic exercises      Plan: Continue per plan of care.      POC expires Unit limit Auth Expiration date PT/OT + Visit Limit?   24 4  60 PT/OT/ [facility]/yr                           Visit/Unit Tracking  AUTH Status:  Date 3/26 3/29 4/2 4/5  4/9 4/11 4/16 4/19       No Auth Used 1 2 3 4 5 6 7 8        Remaining  59 58 57 56 55 54 53 52        FOTO 54 (68)                    Precautions: standard    Access Code: [email or web address]  URL: [email or web address]  Date: 2024  Prepared by: Billie Calhoun    Exercises  - Seated Assisted Cervical Rotation with Towel  - 1 x daily - 7 x weekly - 3 sets - 10 reps  - Mid-Lower Cervical Extension SNAG with Strap  - 1 x daily - 7 x weekly - 3 sets - 10 reps    Manuals 3/26 3/29 4/2 4/5  4/9 4/11 4/16 4/19     Manual distraction GERRY GARRETT CD     Upper trap stretch GERRY GARRETT JR  "  CD MH CD                  TherEx             HEP/pt ed 5            SNAG rot 10x ea 10x ea 10x 10x ea 10x 10x       SNAG ext 10x 10x  10x ea 10x  10x 10x                    Pulleys w/Cervical rot  20x  30x 30x  30x 30x 30x 30x     Throacic ext   10x   10x 10x 10x 10x  5\" 10x     Open book  3x *pain 15x 15x ea 15x ea  15x ea 15x 3\" 15x w/head turn     Neckslevel rot  1'  30x ea Yellow 30x ea Yellow Green 30x Green 30x 15x 3\" hold 3\" 30x ea Green     Neckslevel AROM       30x      Neckslevel retraction/ext  NV  20x3\" yellow 20x3\" yellow Green 30x Green 30x 30x green 3\" 30x Green                  Tband row  BTB 20x 30x BTB 30x 30x 30x  Blue TB 30x Blue TB 30x     Tband Shoulder ext  BTB 20x  30x BTB 30x  30x 30x Blue TB 30x Blue TB 30x                               procedure             Mech traction  20# static 10'  21# static 10' 21# static 10' 22#/  10' Held today 22#/ 10' 23# x 10 min                                                                      Gait Training                                       Modalities                                                        "

## 2024-04-22 ENCOUNTER — OFFICE VISIT (OUTPATIENT)
Dept: PHYSICAL THERAPY | Age: 54
End: 2024-04-22
Payer: COMMERCIAL

## 2024-04-22 DIAGNOSIS — M54.2 CERVICALGIA: ICD-10-CM

## 2024-04-22 DIAGNOSIS — M54.12 CERVICAL RADICULOPATHY: Primary | ICD-10-CM

## 2024-04-22 PROCEDURE — 97110 THERAPEUTIC EXERCISES: CPT

## 2024-04-22 PROCEDURE — 97012 MECHANICAL TRACTION THERAPY: CPT

## 2024-04-22 PROCEDURE — 97140 MANUAL THERAPY 1/> REGIONS: CPT

## 2024-04-22 NOTE — PROGRESS NOTES
Daily Note     Today's date: 2024  Patient name: Danis Forde  : 1970  MRN: [id number]  Referring provider: Troy Cueto MD  Dx:   Encounter Diagnosis     ICD-10-CM    1. Cervical radiculopathy  M54.12       2. Cervicalgia  M54.2           Start Time: 1600  Stop Time: 1655  Total time in clinic (min): 55 minutes    Subjective: Reports continued pain that is intermittent since his trip. States his pain today is at his R CT junction into his R UT to the middle of his shoulder.       Objective: See treatment diary below      Assessment:  TTP at R CTJ with manual work. Cues for proper performance of exercises and to ensure proper dosage is completed. Patient was able to complete  program as documented below without exacerbation of sx. Reduced pain post manual and even further reduced post mechanical traction. Patient would benefit from continued PT      Plan: Continue per plan of care.      POC expires Unit limit Auth Expiration date PT/OT + Visit Limit?   24 4  60 PT/OT/ [facility]/yr                           Visit/Unit Tracking  AUTH Status:  Date 3/26 3/29 4/2 4/5  4/9 4/11 4/16 4/19 4/22      No Auth Used 1 2 3 4 5 6 7 8 9       Remaining  59 58 57 56 55 54 53 52 51       FOTO 54 (68)                    Precautions: standard    Access Code: [email or web address]  URL: [email or web address]  Date: 2024  Prepared by: iBllie Calhoun    Exercises  - Seated Assisted Cervical Rotation with Towel  - 1 x daily - 7 x weekly - 3 sets - 10 reps  - Mid-Lower Cervical Extension SNAG with Strap  - 1 x daily - 7 x weekly - 3 sets - 10 reps    Manuals 3/26 3/29 4/2 4/5  4/9 4/11 4/16 4/19 4/22    Manual distraction  JR GERRY JR GERRY GARRETT CD JR    Upper trap stretch MH JR  GERRY JR   MARCIANO GARRETT CD JR                  TherEx             HEP/pt ed 5            SNAG rot 10x ea 10x ea 10x 10x ea 10x 10x   10x     SNAG ext 10x 10x  10x ea 10x  10x 10x   10x                  Pulleys w/Cervical rot  20x  30x 30x  30x 30x 30x  "30x 30x     Throacic ext   10x   10x 10x 10x 10x  5\" 10x 5\"x10    Open book  3x *pain 15x 15x ea 15x ea  15x ea 15x 3\" 15x w/head turn 3\" 15x w/head turn    Neckslevel rot  1'  30x ea Yellow 30x ea Yellow Green 30x Green 30x 15x 3\" hold 3\" 30x ea Green 3\" 30x ea Green    Neckslevel AROM       30x      Neckslevel retraction/ext  NV  20x3\" yellow 20x3\" yellow Green 30x Green 30x 30x green 3\" 30x Green 3\" 30x Green                 Tband row  BTB 20x 30x BTB 30x 30x 30x  Blue TB 30x Blue TB 30x Blue TB 30x    Tband Shoulder ext  BTB 20x  30x BTB 30x  30x 30x Blue TB 30x Blue TB 30x Blue TB 30x                              procedure             Mech traction  20# static 10'  21# static 10' 21# static 10' 22#/  10' Held today 22#/ 10' 23# x 10 min 23# x 10 min                                                                     Gait Training                                       Modalities                                                          "

## 2024-04-26 ENCOUNTER — OFFICE VISIT (OUTPATIENT)
Dept: PHYSICAL THERAPY | Age: 54
End: 2024-04-26
Payer: COMMERCIAL

## 2024-04-26 DIAGNOSIS — M54.2 CERVICALGIA: ICD-10-CM

## 2024-04-26 DIAGNOSIS — M54.12 CERVICAL RADICULOPATHY: Primary | ICD-10-CM

## 2024-04-26 PROCEDURE — 97012 MECHANICAL TRACTION THERAPY: CPT

## 2024-04-26 PROCEDURE — 97140 MANUAL THERAPY 1/> REGIONS: CPT

## 2024-04-26 PROCEDURE — 97110 THERAPEUTIC EXERCISES: CPT

## 2024-04-26 NOTE — PROGRESS NOTES
Daily Note     Today's date: 2024  Patient name: Danis Forde  : 1970  MRN: [id number]  Referring provider: Troy Cueto MD  Dx:   Encounter Diagnosis     ICD-10-CM    1. Cervical radiculopathy  M54.12       2. Cervicalgia  M54.2           Start Time: 1600  Stop Time: 1650  Total time in clinic (min): 50 minutes    Subjective: Reports overall improvement, but still does not feel as good as prior to his trip. States he feels like there is a knot at his lower CTJ region.       Objective: See treatment diary below      Assessment: Added hooklying thoracic extension over blue foam roll with neck supported by B hands. Patient tolerated well, instructed to move up and down on the roll to get various levels. Some cues for direction of pull and towel placement for SNAGS. Relief felt with manual UT stretching. Increased weight on mechanical traction with no adverse effects noted, will assess response next session.  Patient would benefit from continued PT      Plan: Continue per plan of care.      POC expires Unit limit Auth Expiration date PT/OT + Visit Limit?   24 4  60 PT/OT/ [facility]/yr                           Visit/Unit Tracking  AUTH Status:  Date 3/26 3/29 4/2 4/5  4/9 4/11 4/16 4/19 4/22 4/26     No Auth Used 1 2 3 4 5 6 7 8 9 10      Remaining  59 58 57 56 55 54 53 52 51 50      FOTO 54 (68)                    Precautions: standard    Access Code: [email or web address]  URL: [email or web address]  Date: 2024  Prepared by: Billie Calhoun    Exercises  - Seated Assisted Cervical Rotation with Towel  - 1 x daily - 7 x weekly - 3 sets - 10 reps  - Mid-Lower Cervical Extension SNAG with Strap  - 1 x daily - 7 x weekly - 3 sets - 10 reps    Manuals 3/26 3/29 4/2 4/5  4/9 4/11 4/16 4/19 4/22 4/26   Manual distraction  JR GERRY FERRARI  MARCIANO MH CD JR JR    Upper trap stretch  JR  GERRY JR  GERRY CD MH CD JR  JR                 TherEx             HEP/pt ed 5            SNAG rot 10x ea 10x ea 10x 10x ea 10x 10x  "  10x  10x   SNAG ext 10x 10x  10x ea 10x  10x 10x   10x  10x                Pulleys w/Cervical rot  20x  30x 30x  30x 30x 30x 30x 30x  30x    Throacic ext   10x   10x 10x 10x 10x  5\" 10x 5\"x10 Over foam roll on table 10x   Open book  3x *pain 15x 15x ea 15x ea  15x ea 15x 3\" 15x w/head turn 3\" 15x w/head turn 3\" 15x w/head turn   Neckslevel rot  1'  30x ea Yellow 30x ea Yellow Green 30x Green 30x 15x 3\" hold 3\" 30x ea Green 3\" 30x ea Green 3\" 30x ea Green   Neckslevel AROM       30x      Neckslevel retraction/ext  NV  20x3\" yellow 20x3\" yellow Green 30x Green 30x 30x green 3\" 30x Green 3\" 30x Green 3\" 30x ea Green                Tband row  BTB 20x 30x BTB 30x 30x 30x  Blue TB 30x Blue TB 30x Blue TB 30x Blue TB 30x   Tband Shoulder ext  BTB 20x  30x BTB 30x  30x 30x Blue TB 30x Blue TB 30x Blue TB 30x Blue TB 30x                             procedure             Mech traction  20# static 10'  21# static 10' 21# static 10' 22#/  10' Held today 22#/ 10' 23# x 10 min 23# x 10 min 25# 10 min static                                                                     Gait Training                                       Modalities                                                            "

## 2024-04-30 ENCOUNTER — EVALUATION (OUTPATIENT)
Dept: PHYSICAL THERAPY | Age: 54
End: 2024-04-30
Payer: COMMERCIAL

## 2024-04-30 DIAGNOSIS — M54.12 CERVICAL RADICULOPATHY: Primary | ICD-10-CM

## 2024-04-30 DIAGNOSIS — M54.2 CERVICALGIA: ICD-10-CM

## 2024-04-30 PROCEDURE — 97012 MECHANICAL TRACTION THERAPY: CPT | Performed by: PHYSICAL THERAPIST

## 2024-04-30 PROCEDURE — 97110 THERAPEUTIC EXERCISES: CPT | Performed by: PHYSICAL THERAPIST

## 2024-04-30 PROCEDURE — 97140 MANUAL THERAPY 1/> REGIONS: CPT | Performed by: PHYSICAL THERAPIST

## 2024-04-30 NOTE — PROGRESS NOTES
PT Re-Evaluation     Today's date: 2024  Patient name: Danis Forde  : 1970  MRN: [id number]  Referring provider: Troy Cueto MD  Dx:   Encounter Diagnosis     ICD-10-CM    1. Cervical radiculopathy  M54.12       2. Cervicalgia  M54.2           Start Time: 1615  Stop Time: 1710  Total time in clinic (min): 55 minutes    Assessment  Assessment details: Problem list:  1.  Cervical hypomobility limiting extension, right sidebending ,right rotation  2.  Poor posture    Danis Forde is a pleasant 53-year-old male who presents to physical therapy today with a 3-week history of right-sided neck pain and right shoulder upper arm pain.  He has cervical hypomobility lacking extension, right sidebending, right rotation indicating a closing restriction and producing the pain he is experiencing.  He does have postural changes including decreased cervical lordosis prominence of the CT junction and increased thoracic kyphosis.  There are no neurologic signs and no evidence of weakness.  At this time no referral appears necessary and I expect the patient will respond favorably to physical therapy with mobility exercises and postural education.    Comparable signs  1.  Cervical extension  2.  Right cervical rotation  3.  Sleeping tolerance    24  Danis is making good progress overall. He does note decreased pain but has residual tightness at the end range of cervical rotation right greater than left.  He continues to have right-sided discomfort with cervical extension with mild limitation.  His deep neck flexor endurance has improved as well as his postural endurance.  He did have a flareup recently after carrying a backpack while he was traveling but feels at this time he is returned to his prior baseline.  His Foto score increased compared to his initial evaluation as he is making progress towards set goals.  At this time Danis is responding well to the current plan of care and feels mechanical traction is  beneficial and would benefit from continued physical therapy to achieve maximal functional potential.  Impairments: abnormal or restricted ROM, lacks appropriate home exercise program, pain with function and poor posture   Functional limitations: Looking up, rotating right, sleeping on left sideUnderstanding of Dx/Px/POC: good   Prognosis: good  Prognosis details: Positive prognostic indicators:  Patient is a willing participant, patient has a positive attitude towards recovery and physical therapy, patient has a good understanding of diagnosis and treatment options, no observed red flags  Negative prognostic indicators:  None    Goals  Short-term goals:  1.  Independent home exercise program for daily performance. MET  2.  Restore full active pain-free range of motion of the cervical spine. Improving   Long-term goals:  1.  Patient will be pain-free for ADLs/IADLs and driving. improving  2.  Patient will be able to sleep without waking due to pain. MET  3.  Patient will be able to manage symptoms independently. By d/c    Plan  Plan details: Patient was a agreeable to stated plan of care.  Patient would benefit from: skilled physical therapy  Planned modality interventions: traction  Planned therapy interventions: joint mobilization, manual therapy, patient education, therapeutic activities, therapeutic exercise, strengthening, stretching, graded exercise, home exercise program, postural training and functional ROM exercises  Frequency: 2x week  Duration in visits: 8  Plan of Care beginning date: 4/30/2024  Plan of Care expiration date: 6/24/2024  Treatment plan discussed with: patient        Subjective Evaluation    History of Present Illness  Mechanism of injury: Pt presents with insidious onset of neck and right arm with progressive worsening 3 weeks. He denies numbness or tingling.    He reports difficulty sleeping and finding position of comfort but denies waking due to neck pain.   Pt did take celebrex prior  to bed with some relief.   The pain is worse when lying on left side.  Pt does work on computer and feels better at times when he takes his right arm behind his head.    24  Pt initially had a flare up after walking with a back pack. He feels like he is slowly getting back to the level of improvement prior to the flare up. He reports his pain is generally worse as the day progresses.  He does report he was able to again wean of Celebrex and is also sleeping better.   Pt notes slow improvement. Residual stiffness.   He is not limited per say in function but has pain with looking up and slightly with driving. It does not affect his job.    The patient's greatest concern is that it is affecting his sleep and he would like to be able to sleep to without pain.   Pt has had prior PT for his neck in 2018 with relief and has a positive outlook with PT.  Patient Goals  Patient goals for therapy: decreased pain and increased motion    Pain  Current pain ratin  At worst pain ratin  Location: right neck referred to reyes upper arm  Quality: dull ache and sharp  Relieving factors: medications  Exacerbated by: nilsa on left, looking up, turning to right.  Progression: improved    Hand dominance: right          Objective     Concurrent Complaints      Additional Special Questions  No red flags     Static Posture     Shoulders  Rounded.    Thoracic Spine  Hyperkyphosis.    Comments  Decreased cervical lordosis with slight prominence of CT junction    Neurological Testing     Sensation   Cervical/Thoracic   Left   Intact: light touch and pin prick    Right   Intact: light touch and pin prick    Reflexes   Left   Biceps (C5/C6): normal (2+)  Brachioradialis (C6): normal (2+)  Triceps (C7): normal (2+)    Right   Biceps (C5/C6): normal (2+)  Brachioradialis (C6): normal (2+)  Triceps (C7): normal (2+)    Active Range of Motion   Cervical/Thoracic Spine       Cervical    Flexion:  WFL  Left lateral flexion:  Restriction  level: minimal  Right lateral flexion:  Restriction level minimal  Left rotation:  WFL  Right rotation: Neck active rotation right: mild subjective tightness at end range.    WFL    Thoracic    Flexion:  WFL  Extension:  Restriction level: minimal  Left lateral flexion:  WFL  Right lateral flexion:  WFL  Left rotation:  Restriction level: moderate  Right rotation:  WFL    Strength/Myotome Testing     Left Shoulder     Planes of Motion   Flexion: 5   Abduction: 5     Isolated Muscles   Lower trapezius: 4   Middle trapezius: 4     Right Shoulder     Planes of Motion   Flexion: 5   Abduction: 5     Isolated Muscles   Lower trapezius: 4   Middle trapezius: 4     Left Elbow   Flexion: 5  Extension: 5    Right Elbow   Flexion: 5  Extension: 5    Left Wrist/Hand   Wrist extension: 5  Wrist flexion: 5    Right Wrist/Hand   Wrist extension: 5  Wrist flexion: 5    Tests   Cervical   Positive vertical compression and cervical distraction test.  Negative neck flexor muscle endurance test.     Left   Positive Spurling's Test A.     Right   Positive Spurling's Test A.     Left Shoulder   Negative ULTT1.     Right Shoulder   Negative ULTT1.     Lumbar   Positive vertical compression .     Additional Tests Details  DNF 30 (improved compared to IE)             POC expires Unit limit Auth Expiration date PT/OT + Visit Limit?   6/24/24 4  60 PT/OT/ [facility]/yr                           Visit/Unit Tracking  AUTH Status:  Date 3/26 3/29 4/2 4/5  4/9 4/11 4/16 4/19 4/22 4/26 4/30    No Auth Used 1 2 3 4 5 6 7 8 9 10      Remaining  59 58 57 56 55 54 53 52 51 50      FOTO 54 (68)          65 (68)          Precautions: standard    Access Code: [email or web address]  URL: [email or web address]  Date: 03/26/2024  Prepared by: Billie Calhoun    Exercises  - Seated Assisted Cervical Rotation with Towel  - 1 x daily - 7 x weekly - 3 sets - 10 reps  - Mid-Lower Cervical Extension SNAG with Strap  - 1 x daily - 7 x weekly - 3 sets - 10  "reps    Manuals 4/30 3/29 4/2 4/5  4/9 4/11 4/16 4/19 4/22 4/26   Manual distraction MH JR MH JR  CD  CD JR JR    Upper trap stretch MH JR   JR   CD  CD JR  JR                 TherEx             HEP/pt ed 5            SNAG rot 10x ea 10x ea 10x 10x ea 10x 10x   10x  10x   SNAG ext 10x 10x  10x ea 10x  10x 10x   10x  10x                Pulleys w/Cervical rot 30x 20x  30x 30x  30x 30x 30x 30x 30x  30x    Throacic ext  Faom roll on tbale 10x 10x   10x 10x 10x 10x  5\" 10x 5\"x10 Over foam roll on table 10x   Open book 15x ea 3x *pain 15x 15x ea 15x ea  15x ea 15x 3\" 15x w/head turn 3\" 15x w/head turn 3\" 15x w/head turn   Neckslevel rot 30x 1'  30x ea Yellow 30x ea Yellow Green 30x Green 30x 15x 3\" hold 3\" 30x ea Green 3\" 30x ea Green 3\" 30x ea Green   Neckslevel AROM       30x      Neckslevel retraction/ext 30x NV  20x3\" yellow 20x3\" yellow Green 30x Green 30x 30x green 3\" 30x Green 3\" 30x Green 3\" 30x ea Green   Prone cervical extension hold NV            Tband row Blk 30x BTB 20x 30x BTB 30x 30x 30x  Blue TB 30x Blue TB 30x Blue TB 30x Blue TB 30x   Tband Shoulder ext Blk 30x BTB 20x  30x BTB 30x  30x 30x Blue TB 30x Blue TB 30x Blue TB 30x Blue TB 30x   Posture row                          procedure             Mech traction 25# static 20# static 10'  21# static 10' 21# static 10' 22#/  10' Held today 22#/ 10' 23# x 10 min 23# x 10 min 25# 10 min static                                                                     Gait Training                                       Modalities                                                   "

## 2024-05-03 ENCOUNTER — OFFICE VISIT (OUTPATIENT)
Dept: PHYSICAL THERAPY | Age: 54
End: 2024-05-03
Payer: COMMERCIAL

## 2024-05-03 DIAGNOSIS — M54.2 CERVICALGIA: ICD-10-CM

## 2024-05-03 DIAGNOSIS — M54.12 CERVICAL RADICULOPATHY: Primary | ICD-10-CM

## 2024-05-03 PROCEDURE — 97110 THERAPEUTIC EXERCISES: CPT

## 2024-05-03 PROCEDURE — 97140 MANUAL THERAPY 1/> REGIONS: CPT

## 2024-05-03 PROCEDURE — 97012 MECHANICAL TRACTION THERAPY: CPT

## 2024-05-03 NOTE — PROGRESS NOTES
Daily Note     Today's date: 5/3/2024  Patient name: Danis Forde  : 1970  MRN: [id number]  Referring provider: Troy Cueto MD  Dx:   Encounter Diagnosis     ICD-10-CM    1. Cervical radiculopathy  M54.12       2. Cervicalgia  M54.2           Start Time: 1345  Stop Time: 1445  Total time in clinic (min): 60 minutes    Subjective: Reports some improvement, but does still have some R c-spine discomfort into his R shoulder.       Objective: See treatment diary below  Patient was seen 1:1 for 25 min.     Assessment:  Performed supine chin tuck with exacerbation of symptoms, repositioned into slight flexion with abolishment of R shoulder pain. Attempted prone cervical extension with some discomfort, cues for retraction and slight modification in ROM to a tolerable range. Continues to find relief post cervical traction. Overall decreased pain post session with no report of distal symptoms.  Patient would benefit from continued PT      Plan: Continue per plan of care.      POC expires Unit limit Auth Expiration date PT/OT + Visit Limit?   24 4  60 PT/OT/ ST [facility]/yr                           Visit/Unit Tracking  AUTH Status:  Date 3/26 3/29 4/2 4/5  4/9 4/11 4/16 4/19 4/22 4/26 4/30 5/3   No Auth Used 1 2 3 4 5 6 7 8 9 10 11 12    Remaining  59 58 57 56 55 54 53 52 51 50 49 48    FOTO 54 (68)          65 (68)          Precautions: standard    Access Code: [email or web address]  URL: [email or web address]  Date: 2024  Prepared by: Billie Calhoun    Exercises  - Seated Assisted Cervical Rotation with Towel  - 1 x daily - 7 x weekly - 3 sets - 10 reps  - Mid-Lower Cervical Extension SNAG with Strap  - 1 x daily - 7 x weekly - 3 sets - 10 reps    Manuals 4/30 5/3   4/5  4/9 4/11 4/16 4/19 4/22 4/26   Manual distraction MH JR  JR MH CD MH CD JR JR    Upper trap stretch MH JR   JR  MH CD MH CD JR  JR                 TherEx             HEP/pt ed 5            SNAG rot 10x ea 10x ea  10x ea 10x 10x   10x  10x  "  SNAG ext 10x 10x   10x  10x 10x   10x  10x                Pulleys w/Cervical rot 30x 30x   30x  30x 30x 30x 30x 30x  30x    Throacic ext  Faom roll on tbale 10x Foam roll on table 10x   10x 10x 10x 10x  5\" 10x 5\"x10 Over foam roll on table 10x   Open book 15x ea 15x ea w/ head turn   15x ea 15x ea  15x ea 15x 3\" 15x w/head turn 3\" 15x w/head turn 3\" 15x w/head turn   Neckslevel rot 30x Green 3\"x30  30x ea Yellow Green 30x Green 30x 15x 3\" hold 3\" 30x ea Green 3\" 30x ea Green 3\" 30x ea Green   Neckslevel AROM       30x      Neckslevel retraction/ext 30x Green 3\"x30 ea  20x3\" yellow Green 30x Green 30x 30x green 3\" 30x Green 3\" 30x Green 3\" 30x ea Green   Prone cervical extension hold NV 5\"x10 w/ towel roll (mod range + tuck)           Tband row Blk 30x Blk 30x  BTB 30x 30x 30x  Blue TB 30x Blue TB 30x Blue TB 30x Blue TB 30x   Tband Shoulder ext Blk 30x Blk 30x  BTB 30x  30x 30x Blue TB 30x Blue TB 30x Blue TB 30x Blue TB 30x   Posture row  NV            Supine chin tuck in slight flexion  2x10                        procedure             Mech traction 25# static 25# static 10' 21# static 10' 22#/  10' Held today 22#/ 10' 23# x 10 min 23# x 10 min 25# 10 min static                                                                     Gait Training                                       Modalities                                                        "

## 2024-05-06 ENCOUNTER — OFFICE VISIT (OUTPATIENT)
Dept: PHYSICAL THERAPY | Age: 54
End: 2024-05-06
Payer: COMMERCIAL

## 2024-05-06 DIAGNOSIS — M54.2 CERVICALGIA: ICD-10-CM

## 2024-05-06 DIAGNOSIS — M54.12 CERVICAL RADICULOPATHY: Primary | ICD-10-CM

## 2024-05-06 PROCEDURE — 97110 THERAPEUTIC EXERCISES: CPT | Performed by: PHYSICAL THERAPIST

## 2024-05-06 PROCEDURE — 97012 MECHANICAL TRACTION THERAPY: CPT | Performed by: PHYSICAL THERAPIST

## 2024-05-06 PROCEDURE — 97140 MANUAL THERAPY 1/> REGIONS: CPT | Performed by: PHYSICAL THERAPIST

## 2024-05-06 NOTE — PROGRESS NOTES
"Daily Note     Today's date: 2024  Patient name: Danis Forde  : 1970  MRN: [id number]  Referring provider: Troy Cueto MD  Dx:   Encounter Diagnosis     ICD-10-CM    1. Cervical radiculopathy  M54.12       2. Cervicalgia  M54.2                      Subjective: Pt to see Dr Cueto tomorrow. No radicular pain. Pt feels he is improving slowly. Sleeping better without use of Celebrex.  Less pain in am and worsens as day progresses.     Objective: See treatment diary below      Assessment: Decreased pain. PROM full with mild limitation in rotation.       Plan: Continue per plan of care.      POC expires Unit limit Auth Expiration date PT/OT + Visit Limit?   24 4  60 PT/OT/ [facility]/yr                           Visit/Unit Tracking  AUTH Status:  Date 3/26 3/29 4/2 4/5  4/9 4/11 4/16 4/19 4/22 4/26 4/30 5/3   No Auth Used 1 2 3 4 5 6 7 8 9 10 11 12    Remaining  59 58 57 56 55 54 53 52 51 50 49 48    FOTO 54 (68)          65 (68)      Visit/Unit Tracking  AUTH Status:  Date               No Auth  Used 13               Remaining  47               FOTO                       Precautions: standard    Access Code: [email or web address]  URL: [email or web address]  Date: 2024  Prepared by: Billie Calhoun    Exercises  - Seated Assisted Cervical Rotation with Towel  - 1 x daily - 7 x weekly - 3 sets - 10 reps  - Mid-Lower Cervical Extension SNAG with Strap  - 1 x daily - 7 x weekly - 3 sets - 10 reps    Manuals 4/30 5/3  5/6 4/5  4/9 4/11 4/16 4/19 4/22 4/26   Manual distraction  JR  JR  CD MH CD JR JR    Upper trap stretch  JR   JR   CD  CD JR  JR                 TherEx             HEP/pt ed 5            SNAG rot 10x ea 10x ea 10x 10x ea 10x 10x   10x  10x   SNAG ext 10x 10x  10xx 10x  10x 10x   10x  10x                Pulleys w/Cervical rot 30x 30x  30x 30x  30x 30x 30x 30x 30x  30x    Throacic ext  Faom roll on tbale 10x Foam roll on table 10x  20x 10x 10x 10x 10x  5\" 10x 5\"x10 Over " "foam roll on table 10x   Open book 15x ea 15x ea w/ head turn  15 15x ea 15x ea  15x ea 15x 3\" 15x w/head turn 3\" 15x w/head turn 3\" 15x w/head turn   Neckslevel rot 30x Green 3\"x30 30x 30x ea Yellow Green 30x Green 30x 15x 3\" hold 3\" 30x ea Green 3\" 30x ea Green 3\" 30x ea Green   Neckslevel AROM       30x      Neckslevel retraction/ext 30x Green 3\"x30 ea 30x 20x3\" yellow Green 30x Green 30x 30x green 3\" 30x Green 3\" 30x Green 3\" 30x ea Green   Prone cervical extension hold NV 5\"x10 w/ towel roll (mod range + tuck) 5\"x10 w/towel 10x         Prone ext off table   30\"x3          Tband row Blk 30x Blk 30x 30x BTB 30x 30x 30x  Blue TB 30x Blue TB 30x Blue TB 30x Blue TB 30x   Tband Shoulder ext Blk 30x Blk 30x 30x BTB 30x  30x 30x Blue TB 30x Blue TB 30x Blue TB 30x Blue TB 30x   Posture row  NV  30x          Supine chin tuck in slight flexion  2x10 2x10                       procedure             Mech traction 25# static 25# static 10'  25#/ 10' 21# static 10' 22#/  10' Held today 22#/ 10' 23# x 10 min 23# x 10 min 25# 10 min static                                                                     Gait Training                                       Modalities                                                          "

## 2024-05-07 ENCOUNTER — OFFICE VISIT (OUTPATIENT)
Dept: OBGYN CLINIC | Facility: CLINIC | Age: 54
End: 2024-05-07
Payer: COMMERCIAL

## 2024-05-07 VITALS
SYSTOLIC BLOOD PRESSURE: 128 MMHG | HEIGHT: 72 IN | HEART RATE: 91 BPM | BODY MASS INDEX: 33.67 KG/M2 | WEIGHT: 248.6 LBS | DIASTOLIC BLOOD PRESSURE: 72 MMHG | TEMPERATURE: 99.1 F

## 2024-05-07 DIAGNOSIS — M54.2 CERVICALGIA: ICD-10-CM

## 2024-05-07 DIAGNOSIS — M54.12 CERVICAL RADICULOPATHY: Primary | ICD-10-CM

## 2024-05-07 PROCEDURE — 99214 OFFICE O/P EST MOD 30 MIN: CPT | Performed by: FAMILY MEDICINE

## 2024-05-07 NOTE — PATIENT INSTRUCTIONS
F/u after MRI  MRI-  C spine- Neck pain/XR neg/PT 6 wks, pain with ROM, radiculopathy.   Icing/heat/OTC pain meds as needed.  Continue therapy/home exercises.

## 2024-05-07 NOTE — PROGRESS NOTES
[facility] [address] [place]  [place] [id number] [phone]      Assessment:  1. Cervical radiculopathy  -     MRI cervical spine wo contrast; Future; Expected date: 05/07/2024    2. Cervicalgia  -     MRI cervical spine wo contrast; Future; Expected date: 05/07/2024        Plan:  Patient Instructions   F/u after MRI  MRI-  C spine- Neck pain/XR neg/PT 6 wks, pain with ROM, radiculopathy.   Icing/heat/OTC pain meds as needed.  Continue therapy/home exercises.   Return for f/u after MRI  C spine, Recheck.    Chief Complaint:  Chief Complaint   Patient presents with    Neck - Follow-up       Subjective:   HPI    Patient ID: Danis Forde is a 53 y.o. male     Here for f/u  R shoulder pain/neck pain  Getting better- less pain  Going to PT  Better ROM  Neck is stiff- pain looking up  Intermittent achey pain  Denies n/t   radiates from neck to shoulder when looking up.  No pain meds needed.      Review of Systems   Constitutional:  Negative for fatigue and fever.   Respiratory:  Negative for shortness of breath.    Cardiovascular:  Negative for chest pain.   Gastrointestinal:  Negative for abdominal pain and nausea.   Genitourinary:  Negative for dysuria.   Musculoskeletal:  Positive for arthralgias and neck pain.   Skin:  Negative for rash and wound.   Neurological:  Negative for weakness and headaches.       Objective:  Vitals:  /72 (BP Location: Left arm, Patient Position: Sitting, Cuff Size: Large)   Pulse 91   Temp 99.1 °F (37.3 °C) (Tympanic)   Ht 6' (1.829 m)   Wt 113 kg (248 lb 9.6 oz)   BMI 33.72 kg/m²     The following portions of the patient's history were reviewed and updated as appropriate: allergies, current medications, past family history, past medical history, past social history, past surgical history, and problem list.    Physical exam:  Physical Exam  Back Exam     Tenderness   The patient is experiencing tenderness in the  cervical.    Range of Motion   Extension:  abnormal   Flexion:  normal   Lateral bend right:  normal   Lateral bend left:  normal   Rotation right:  normal   Rotation left:  normal     Comments:  Pain with ext  Pos spurling R  DTR'S BUE 2+                  Troy Cueto MD

## 2024-05-10 ENCOUNTER — OFFICE VISIT (OUTPATIENT)
Dept: PHYSICAL THERAPY | Age: 54
End: 2024-05-10
Payer: COMMERCIAL

## 2024-05-10 DIAGNOSIS — M54.12 CERVICAL RADICULOPATHY: Primary | ICD-10-CM

## 2024-05-10 DIAGNOSIS — M54.2 CERVICALGIA: ICD-10-CM

## 2024-05-10 PROCEDURE — 97140 MANUAL THERAPY 1/> REGIONS: CPT

## 2024-05-10 PROCEDURE — 97012 MECHANICAL TRACTION THERAPY: CPT

## 2024-05-10 PROCEDURE — 97110 THERAPEUTIC EXERCISES: CPT

## 2024-05-10 NOTE — PROGRESS NOTES
Daily Note     Today's date: 5/10/2024  Patient name: Danis Forde  : 1970  MRN: [id number]  Referring provider: Troy Cueto MD  Dx:   Encounter Diagnosis     ICD-10-CM    1. Cervical radiculopathy  M54.12       2. Cervicalgia  M54.2           Start Time: 1115  Stop Time: 1210  Total time in clinic (min): 55 minutes    Subjective: Reports continued improvement. Going for an MRI next week.       Objective: See treatment diary below      Assessment:  Good recall of his program, few cues for proper techniques. Some cues for carryover of program and to ensure proper dosage is completed. No c/o elevated pain t/o session. Patient would benefit from continued PT.      Plan: Continue per plan of care.      POC expires Unit limit Auth Expiration date PT/OT + Visit Limit?   24 4  60 PT/OT/ [facility]/yr                           Visit/Unit Tracking  AUTH Status:  Date 3/26 3/29 4/2 4/5  4/9 4/11 4/16 4/19 4/22 4/26 4/30 5/3   No Auth Used 1 2 3 4 5 6 7 8 9 10 11 12    Remaining  59 58 57 56 55 54 53 52 51 50 49 48    FOTO 54 (68)          65 (68)      Visit/Unit Tracking  AUTH Status:  Date 5/6 5/10             No Auth  Used 13 14              Remaining  47 46              FOTO                       Precautions: standard    Access Code: [email or web address]  URL: [email or web address]  Date: 2024  Prepared by: Billie Calhoun    Exercises  - Seated Assisted Cervical Rotation with Towel  - 1 x daily - 7 x weekly - 3 sets - 10 reps  - Mid-Lower Cervical Extension SNAG with Strap  - 1 x daily - 7 x weekly - 3 sets - 10 reps    Manuals 4/30 5/3  5/6 5/10  4/11 4/16 4/19 4/22 4/26   Manual distraction MH JR MH JR  CD MH CD JR JR    Upper trap stretch MH JR  MH JR  CD MH CD JR  JR                 TherEx             HEP/pt ed 5            SNAG rot 10x ea 10x ea 10x 10x  10x   10x  10x   SNAG ext 10x 10x  10xx 10x  10x   10x  10x                Pulleys w/Cervical rot 30x 30x  30x 30x  30x 30x 30x 30x  30x   "  Throacic ext  Faom roll on tbale 10x Foam roll on table 10x  20x Foam roll on table 10x  10x 10x  5\" 10x 5\"x10 Over foam roll on table 10x   Open book 15x ea 15x ea w/ head turn  15 15x ea  15x ea 15x 3\" 15x w/head turn 3\" 15x w/head turn 3\" 15x w/head turn   Neckslevel rot 30x Green 3\"x30 30x Green 30x   Green 30x 15x 3\" hold 3\" 30x ea Green 3\" 30x ea Green 3\" 30x ea Green   Neckslevel AROM       30x      Neckslevel retraction/ext 30x Green 3\"x30 ea 30x Green 30x  Green 30x 30x green 3\" 30x Green 3\" 30x Green 3\" 30x ea Green   Prone cervical extension hold NV 5\"x10 w/ towel roll (mod range + tuck) 5\"x10 w/towel 5\"x10 w/towel         Prone ext off table   30\"x3 30\"x3         Tband row Blk 30x Blk 30x 30x Blk 30x  30x  Blue TB 30x Blue TB 30x Blue TB 30x Blue TB 30x   Tband Shoulder ext Blk 30x Blk 30x 30x Blk 30x   30x Blue TB 30x Blue TB 30x Blue TB 30x Blue TB 30x   Posture row  NV  30x Blk 30x          Supine chin tuck in slight flexion  2x10 2x10 2x10                      procedure             Mech traction 25# static 25# static 10'  25#/ 10' 25#/ 10'  Held today 22#/ 10' 23# x 10 min 23# x 10 min 25# 10 min static                                                                     Gait Training                                       Modalities                                                            "

## 2024-05-13 ENCOUNTER — OFFICE VISIT (OUTPATIENT)
Dept: PHYSICAL THERAPY | Age: 54
End: 2024-05-13
Payer: COMMERCIAL

## 2024-05-13 DIAGNOSIS — M54.2 CERVICALGIA: ICD-10-CM

## 2024-05-13 DIAGNOSIS — M54.12 CERVICAL RADICULOPATHY: Primary | ICD-10-CM

## 2024-05-13 PROCEDURE — 97140 MANUAL THERAPY 1/> REGIONS: CPT | Performed by: PHYSICAL THERAPIST

## 2024-05-13 PROCEDURE — 97012 MECHANICAL TRACTION THERAPY: CPT | Performed by: PHYSICAL THERAPIST

## 2024-05-13 PROCEDURE — 97110 THERAPEUTIC EXERCISES: CPT | Performed by: PHYSICAL THERAPIST

## 2024-05-13 NOTE — PROGRESS NOTES
Daily Note     Today's date: 2024  Patient name: Danis Forde  : 1970  MRN: [id number]  Referring provider: Troy Cueto MD  Dx:   Encounter Diagnosis     ICD-10-CM    1. Cervical radiculopathy  M54.12       2. Cervicalgia  M54.2           Start Time: 1630  Stop Time: 1720  Total time in clinic (min): 50 minutes    Subjective: Pt reports he feels like he has gotten over the hump over the past week and feels improvement.   Pt does have MRI upcoming.     Objective: See treatment diary below      Assessment: Pt making good progress.Decreased pain. Full left rotation, mild right rotation limitation A/PROM with reduced pain.       Plan: Continue per plan of care.      POC expires Unit limit Auth Expiration date PT/OT + Visit Limit?   24 4  60 PT/OT/ [facility]/yr                           Visit/Unit Tracking  AUTH Status:  Date 3/26 3/29 4/2 4/5  4/9 4/11 4/16 4/19 4/22 4/26 4/30 5/3   No Auth Used 1 2 3 4 5 6 7 8 9 10 11 12    Remaining  59 58 57 56 55 54 53 52 51 50 49 48    FOTO 54 (68)          65 (68)      Visit/Unit Tracking  AUTH Status:  Date 5/6 5/10 5/13            No Auth  Used 13 14 15             Remaining  47 46 45             FOTO                       Precautions: standard    Access Code: [email or web address]  URL: [email or web address]  Date: 2024  Prepared by: Billie Calhoun    Exercises  - Seated Assisted Cervical Rotation with Towel  - 1 x daily - 7 x weekly - 3 sets - 10 reps  - Mid-Lower Cervical Extension SNAG with Strap  - 1 x daily - 7 x weekly - 3 sets - 10 reps    Manuals 4/30 5/3  5/6 5/10 5/13 4/11 4/16 4/19 4/22 4/26   Manual distraction GERRY JR GERRY JR  CD MH CD JR JR    Upper trap stretch  JR  GERRY JR  CD MH CD JR  JR                 TherEx             HEP/pt ed 5            SNAG rot 10x ea 10x ea 10x 10x  10x   10x  10x   SNAG ext 10x 10x  10xx 10x  10x   10x  10x                Pulleys w/Cervical rot 30x 30x  30x 30x 30x 30x 30x 30x 30x  30x    Throacic ext  Faom roll  "on tbale 10x Foam roll on table 10x  20x Foam roll on table 10x  10x 10x  5\" 10x 5\"x10 Over foam roll on table 10x   Open book 15x ea 15x ea w/ head turn  15 15x ea 15x 15x ea 15x 3\" 15x w/head turn 3\" 15x w/head turn 3\" 15x w/head turn   Neckslevel rot 30x Green 3\"x30 30x Green 30x  30x Green 30x 15x 3\" hold 3\" 30x ea Green 3\" 30x ea Green 3\" 30x ea Green   Neckslevel AROM       30x      Neckslevel retraction/ext 30x Green 3\"x30 ea 30x Green 30x 30x Green 30x 30x green 3\" 30x Green 3\" 30x Green 3\" 30x ea Green   Prone cervical extension hold NV 5\"x10 w/ towel roll (mod range + tuck) 5\"x10 w/towel 5\"x10 w/towel         Prone ext off table   30\"x3 30\"x3 30\"x3        Tband row Blk 30x Blk 30x 30x Blk 30x  30x  Blue TB 30x Blue TB 30x Blue TB 30x Blue TB 30x   Tband Shoulder ext Blk 30x Blk 30x 30x Blk 30x   30x Blue TB 30x Blue TB 30x Blue TB 30x Blue TB 30x   Posture row  NV  30x Blk 30x          Supine chin tuck in slight flexion  2x10 2x10 2x10                      procedure             Mech traction 25# static 25# static 10'  25#/ 10' 25#/ 10'  Held today 22#/ 10' 23# x 10 min 23# x 10 min 25# 10 min static                                                                     Gait Training                                       Modalities                                                              "

## 2024-05-17 ENCOUNTER — OFFICE VISIT (OUTPATIENT)
Dept: PHYSICAL THERAPY | Age: 54
End: 2024-05-17
Payer: COMMERCIAL

## 2024-05-17 DIAGNOSIS — M54.2 CERVICALGIA: ICD-10-CM

## 2024-05-17 DIAGNOSIS — M54.12 CERVICAL RADICULOPATHY: Primary | ICD-10-CM

## 2024-05-17 PROCEDURE — 97110 THERAPEUTIC EXERCISES: CPT | Performed by: PHYSICAL THERAPIST

## 2024-05-17 PROCEDURE — 97012 MECHANICAL TRACTION THERAPY: CPT | Performed by: PHYSICAL THERAPIST

## 2024-05-17 PROCEDURE — 97140 MANUAL THERAPY 1/> REGIONS: CPT | Performed by: PHYSICAL THERAPIST

## 2024-05-17 NOTE — PROGRESS NOTES
Daily Note     Today's date: 2024  Patient name: Danis Forde  : 1970  MRN: [id number]  Referring provider: Troy Cueto MD  Dx:   Encounter Diagnosis     ICD-10-CM    1. Cervical radiculopathy  M54.12       2. Cervicalgia  M54.2           Start Time: 955  Stop Time: 1045  Total time in clinic (min): 50 minutes    Subjective: Pt reports he has been feeling really good all week. His neck pain is diminished. He does have an MRI tomorrow.       Objective: See treatment diary below      Assessment: Pt making good progress, no arm pain. Improved mobility in cervical rotation.       Plan: Continue per plan of care.      POC expires Unit limit Auth Expiration date PT/OT + Visit Limit?   24 4  60 PT/OT/ [facility]/yr                           Visit/Unit Tracking  AUTH Status:  Date 3/26 3/29 4/2 4/5  4/9 4/11 4/16 4/19 4/22 4/26 4/30 5/3   No Auth Used 1 2 3 4 5 6 7 8 9 10 11 12    Remaining  59 58 57 56 55 54 53 52 51 50 49 48    FOTO 54 (68)          65 (68)      Visit/Unit Tracking  AUTH Status:  Date 5/6 5/10 5/13 5/17           No Auth  Used 13 14 15 16            Remaining  47 46 45 44            FOTO                       Precautions: standard    Access Code: [email or web address]  URL: [email or web address]  Date: 2024  Prepared by: Billie Calhoun    Exercises  - Seated Assisted Cervical Rotation with Towel  - 1 x daily - 7 x weekly - 3 sets - 10 reps  - Mid-Lower Cervical Extension SNAG with Strap  - 1 x daily - 7 x weekly - 3 sets - 10 reps    Manuals 4/30 5/3  5/6 5/10 5/13 5/17 4/16 4/19 4/22 4/26   Manual distraction  JR  JR  [facility] CD JR JR    Upper trap stretch  JR   JR  [facility] CD JR  JR                 TherEx             HEP/pt ed 5            SNAG rot 10x ea 10x ea 10x 10x  10x   10x  10x   SNAG ext 10x 10x  10xx 10x  10x   10x  10x                Pulleys w/Cervical rot 30x 30x  30x 30x 30x 30x 30x 30x 30x  30x    Throacic ext  Faom roll on tbale 10x Foam roll on table 10x  20x  "Foam roll on table 10x  10x 10x  5\" 10x 5\"x10 Over foam roll on table 10x   Open book 15x ea 15x ea w/ head turn  15 15x ea 15x 15x ea 15x 3\" 15x w/head turn 3\" 15x w/head turn 3\" 15x w/head turn   Neckslevel rot 30x Green 3\"x30 30x Green 30x  30x Green 30x 15x 3\" hold 3\" 30x ea Green 3\" 30x ea Green 3\" 30x ea Green   Neckslevel AROM       30x      Neckslevel retraction/ext 30x Green 3\"x30 ea 30x Green 30x 30x Green 30x 30x green 3\" 30x Green 3\" 30x Green 3\" 30x ea Green   Prone cervical extension hold NV 5\"x10 w/ towel roll (mod range + tuck) 5\"x10 w/towel 5\"x10 w/towel  10x       Prone ext off table   30\"x3 30\"x3 30\"x3 30\"x5       Tband row Blk 30x Blk 30x 30x Blk 30x  30x  Blue TB 30x Blue TB 30x Blue TB 30x Blue TB 30x   Tband Shoulder ext Blk 30x Blk 30x 30x Blk 30x   30x Blue TB 30x Blue TB 30x Blue TB 30x Blue TB 30x   Posture row  NV  30x Blk 30x          Supine chin tuck in slight flexion  2x10 2x10 2x10                      procedure             Mech traction 25# static 25# static 10'  25#/ 10' 25#/ 10'  Held today 22#/ 10' 23# x 10 min 23# x 10 min 25# 10 min static                                                                     Gait Training                                       Modalities                                                                "

## 2024-05-18 ENCOUNTER — HOSPITAL ENCOUNTER (OUTPATIENT)
Dept: MRI IMAGING | Facility: HOSPITAL | Age: 54
Discharge: HOME | End: 2024-05-18
Attending: FAMILY MEDICINE
Payer: COMMERCIAL

## 2024-05-18 DIAGNOSIS — M54.12 CERVICAL RADICULOPATHY: ICD-10-CM

## 2024-05-18 DIAGNOSIS — M54.2 CERVICALGIA: ICD-10-CM

## 2024-05-18 PROCEDURE — 72141 MRI NECK SPINE W/O DYE: CPT

## 2024-05-21 ENCOUNTER — OFFICE VISIT (OUTPATIENT)
Dept: PHYSICAL THERAPY | Age: 54
End: 2024-05-21
Payer: COMMERCIAL

## 2024-05-21 DIAGNOSIS — M54.2 CERVICALGIA: ICD-10-CM

## 2024-05-21 DIAGNOSIS — M54.12 CERVICAL RADICULOPATHY: Primary | ICD-10-CM

## 2024-05-21 PROCEDURE — 97012 MECHANICAL TRACTION THERAPY: CPT | Performed by: PHYSICAL THERAPIST

## 2024-05-21 PROCEDURE — 97110 THERAPEUTIC EXERCISES: CPT | Performed by: PHYSICAL THERAPIST

## 2024-05-21 PROCEDURE — 97140 MANUAL THERAPY 1/> REGIONS: CPT | Performed by: PHYSICAL THERAPIST

## 2024-05-21 NOTE — PROGRESS NOTES
Daily Note     Today's date: 2024  Patient name: Danis Forde  : 1970  MRN: [id number]  Referring provider: Troy Cueto MD  Dx:   Encounter Diagnosis     ICD-10-CM    1. Cervical radiculopathy  M54.12       2. Cervicalgia  M54.2           Start Time: 1115  Stop Time: 1210  Total time in clinic (min): 55 minutes    Subjective: Pt has no pain, scap pain resolved. Pt doing well. He did have MRI with results pending.      Objective: See treatment diary below      Assessment: Pt doing well overall. No radicular pain.       Plan: Continue per plan of care.  Begin progression to d/c if symptoms remain resolved.      POC expires Unit limit Auth Expiration date PT/OT + Visit Limit?   24 4  60 PT/OT/ ST [facility]/yr                           Visit/Unit Tracking  AUTH Status:  Date 3/26 3/29 4/2 4/5  4/9 4/11 4/16 4/19 4/22 4/26 4/30 5/3   No Auth Used 1 2 3 4 5 6 7 8 9 10 11 12    Remaining  59 58 57 56 55 54 53 52 51 50 49 48    FOTO 54 (68)          65 (68)      Visit/Unit Tracking  AUTH Status:  Date 5/6 5/10 5/13 5/17 5/21          No Auth  Used 13 14 15 16 17           Remaining  47 46 45 44 43           FOTO                       Precautions: standard    Access Code: [email or web address]  URL: [email or web address]  Date: 2024  Prepared by: Billie Calhoun    Exercises  - Seated Assisted Cervical Rotation with Towel  - 1 x daily - 7 x weekly - 3 sets - 10 reps  - Mid-Lower Cervical Extension SNAG with Strap  - 1 x daily - 7 x weekly - 3 sets - 10 reps    Manuals 4/30 5/3  5/6 5/10 5/13 5/17 5/21 4/19 4/22 4/26   Manual distraction  JR  JR  [facility] CD JR JR    Upper trap stretch  JR   JR  [facility] CD JR  JR                 TherEx             HEP/pt ed 5            SNAG rot 10x ea 10x ea 10x 10x  10x 10x  10x  10x   SNAG ext 10x 10x  10xx 10x  10x 10x  10x  10x                Pulleys w/Cervical rot 30x 30x  30x 30x 30x 30x 30x 30x 30x  30x    Throacic ext  Faom roll on tbale 10x Foam roll on table  "10x  20x Foam roll on table 10x  10x 10x  5\" 10x 5\"x10 Over foam roll on table 10x   Open book 15x ea 15x ea w/ head turn  15 15x ea 15x 15x ea 15x 3\" 15x w/head turn 3\" 15x w/head turn 3\" 15x w/head turn   Neckslevel rot 30x Green 3\"x30 30x Green 30x  30x Green 30x 15x 3\" hold 3\" 30x ea Green 3\" 30x ea Green 3\" 30x ea Green   Neckslevel AROM       30x      Neckslevel retraction/ext 30x Green 3\"x30 ea 30x Green 30x 30x Green 30x 30x green 3\" 30x Green 3\" 30x Green 3\" 30x ea Green   Prone cervical extension hold NV 5\"x10 w/ towel roll (mod range + tuck) 5\"x10 w/towel 5\"x10 w/towel  10x 10x      Prone ext off table   30\"x3 30\"x3 30\"x3 30\"x5 30\"x5      Tband row Blk 30x Blk 30x 30x Blk 30x  30blk 30x blk Blue TB 30x Blue TB 30x Blue TB 30x   Tband Shoulder ext Blk 30x Blk 30x 30x Blk 30x   30x blkTB 30x blk Blue TB 30x Blue TB 30x Blue TB 30x   Posture row  NV  30x Blk 30x    30x blk      Supine chin tuck in slight flexion  2x10 2x10 2x10                      procedure             Mech traction 25# static 25# static 10'  25#/ 10' 25#/ 10'  25# 25# 10' 23# x 10 min 23# x 10 min 25# 10 min static                                                                     Gait Training                                       Modalities                                                                  "

## 2024-05-29 ENCOUNTER — APPOINTMENT (OUTPATIENT)
Dept: PHYSICAL THERAPY | Age: 54
End: 2024-05-29
Payer: COMMERCIAL

## 2024-05-31 ENCOUNTER — OFFICE VISIT (OUTPATIENT)
Dept: PHYSICAL THERAPY | Age: 54
End: 2024-05-31
Payer: COMMERCIAL

## 2024-05-31 DIAGNOSIS — M54.12 CERVICAL RADICULOPATHY: Primary | ICD-10-CM

## 2024-05-31 DIAGNOSIS — M54.2 CERVICALGIA: ICD-10-CM

## 2024-05-31 PROCEDURE — 97140 MANUAL THERAPY 1/> REGIONS: CPT

## 2024-05-31 PROCEDURE — 97110 THERAPEUTIC EXERCISES: CPT

## 2024-05-31 NOTE — PROGRESS NOTES
Daily Note     Today's date: 2024  Patient name: Danis Forde  : 1970  MRN: [id number]  Referring provider: Troy Cueto MD  Dx:   Encounter Diagnosis     ICD-10-CM    1. Cervical radiculopathy  M54.12       2. Cervicalgia  M54.2                      Subjective: Pt reports that he is doing well today as far as his neck goes. Notes that he is having increased pain in his low back today noting that he was leaning on a countertop and when he went to walk he noticed increased pain.       Objective: See treatment diary below      Assessment: Pt doing well overall. No radicular pain feeling most relief with the taction to end. Minimal tightness present in his UT's benefiting from manuals.       Plan: Continue per plan of care.  Begin progression to d/c if symptoms remain resolved.      POC expires Unit limit Auth Expiration date PT/OT + Visit Limit?   24 4  60 PT/OT/ ST [facility]/yr                           Visit/Unit Tracking  AUTH Status:  Date 3/26 3/29 4/2 4/5  4/9 4/11 4/16 4/19 4/22 4/26 4/30 5/3   No Auth Used 1 2 3 4 5 6 7 8 9 10 11 12    Remaining  59 58 57 56 55 54 53 52 51 50 49 48    FOTO 54 (68)          65 (68)      Visit/Unit Tracking  AUTH Status:  Date 5/6 5/10 5/13 5/17 5/21 5/31         No Auth  Used 13 14 15 16 17 18          Remaining  47 46 45 44 43 42          FOTO                       Precautions: standard    Access Code: [email or web address]  URL: [email or web address]  Date: 2024  Prepared by: Billie Calhoun    Exercises  - Seated Assisted Cervical Rotation with Towel  - 1 x daily - 7 x weekly - 3 sets - 10 reps  - Mid-Lower Cervical Extension SNAG with Strap  - 1 x daily - 7 x weekly - 3 sets - 10 reps    Manuals 4/30 5/3  5/6 5/10 5/13 5/17 5/21 5/31     Manual distraction   [facility] MM     Upper trap stretch    [facility] MM                  TherEx             HEP/pt ed 5            SNAG rot 10x ea 10x ea 10x 10x  10x 10x 10x     SNAG ext 10x 10x  10xx 10x   "10x 10x 10x                  Pulleys w/Cervical rot 30x 30x  30x 30x 30x 30x 30x 30x     Throacic ext  Faom roll on tbale 10x Foam roll on table 10x  20x Foam roll on table 10x  10x 10x  Held nv     Open book 15x ea 15x ea w/ head turn  15 15x ea 15x 15x ea 15x 15x ea     Neckslevel rot 30x Green 3\"x30 30x Green 30x  30x Green 30x 15x 3\" hold 15x 3\" hd     Neckslevel AROM       30x 30x     Neckslevel retraction/ext 30x Green 3\"x30 ea 30x Green 30x 30x Green 30x 30x green 30x green     Prone cervical extension hold NV 5\"x10 w/ towel roll (mod range + tuck) 5\"x10 w/towel 5\"x10 w/towel  10x 10x 10x     Prone ext off table   30\"x3 30\"x3 30\"x3 30\"x5 30\"x5 30\"x5     Tband row Blk 30x Blk 30x 30x Blk 30x  30blk 30x blk 30x blk     Tband Shoulder ext Blk 30x Blk 30x 30x Blk 30x   30x blkTB 30x blk 30x blk     Posture row  NV  30x Blk 30x    30x blk 30x blk     Supine chin tuck in slight flexion  2x10 2x10 2x10                      procedure             Mech traction 25# static 25# static 10'  25#/ 10' 25#/ 10'  25# 25# 10' 25# 10'                                                                      Gait Training                                       Modalities                                                    "

## 2024-06-03 ENCOUNTER — OFFICE VISIT (OUTPATIENT)
Dept: FAMILY MEDICINE CLINIC | Facility: CLINIC | Age: 54
End: 2024-06-03
Payer: COMMERCIAL

## 2024-06-03 VITALS
WEIGHT: 245 LBS | OXYGEN SATURATION: 97 % | HEIGHT: 72 IN | BODY MASS INDEX: 33.18 KG/M2 | RESPIRATION RATE: 18 BRPM | TEMPERATURE: 97.6 F | DIASTOLIC BLOOD PRESSURE: 70 MMHG | SYSTOLIC BLOOD PRESSURE: 136 MMHG | HEART RATE: 79 BPM

## 2024-06-03 DIAGNOSIS — Z00.00 ENCOUNTER FOR WELL ADULT EXAM WITHOUT ABNORMAL FINDINGS: Primary | ICD-10-CM

## 2024-06-03 DIAGNOSIS — Z12.5 SCREENING PSA (PROSTATE SPECIFIC ANTIGEN): ICD-10-CM

## 2024-06-03 PROCEDURE — 99396 PREV VISIT EST AGE 40-64: CPT | Performed by: NURSE PRACTITIONER

## 2024-06-03 NOTE — PROGRESS NOTES
Adult Annual Physical  Name: Danis Forde      : 1970      MRN: [id number]  Encounter Provider: YANELY Matute  Encounter Date: 6/3/2024   Encounter department: [facility]    Assessment & Plan   1. Encounter for well adult exam without abnormal findings  -     CBC and differential; Future; Expected date: 2024  -     Comprehensive metabolic panel; Future; Expected date: 2024  -     Lipid Panel with Direct LDL reflex; Future; Expected date: 2024  2. Screening PSA (prostate specific antigen)  -     PSA Total (Reflex To Free); Future    Immunizations and preventive care screenings were discussed with patient today. Appropriate education was printed on patient's after visit summary.        Counseling:  Alcohol/drug use: discussed moderation in alcohol intake, the recommendations for healthy alcohol use, and avoidance of illicit drug use.  Dental Health: discussed importance of regular tooth brushing, flossing, and dental visits.  Injury prevention: discussed safety/seat belts, safety helmets, smoke detectors, carbon dioxide detectors, and smoking near bedding or upholstery.  Sexual health: discussed sexually transmitted diseases, partner selection, use of condoms, avoidance of unintended pregnancy, and contraceptive alternatives.  Exercise: the importance of regular exercise/physical activity was discussed. Recommend exercise 3-5 times per week for at least 30 minutes.       Depression Screening and Follow-up Plan: Patient was screened for depression during today's encounter. They screened negative with a PHQ-2 score of 0.    Tobacco Cessation Counseling: Tobacco cessation counseling was provided. The patient is sincerely urged to quit consumption of tobacco. He is not ready to quit tobacco. Medication options discussed.         History of Present Illness     Adult Annual Physical:  Patient presents for annual physical.     Diet and Physical Activity:  -  Diet/Nutrition: well balanced diet.  - Exercise: no formal exercise. attending PT    Depression Screening:  - PHQ-2 Score: 0    General Health:  - Sleep: sleeps well. tinnitus  - Hearing: normal hearing bilateral ears.  - Vision: wears glasses.  - Dental: regular dental visits.    /GYN Health:    - History of STDs: no     Health:  - History of STDs: no.     Advanced Care Planning:  - Has an advanced directive?: yes    - Has a durable medical POA?: yes    - ACP document given to patient?: no      Review of Systems   Constitutional:  Negative for activity change, chills, fatigue and fever.   HENT:  Negative for congestion, ear discharge, ear pain, sinus pressure, sinus pain, sore throat, tinnitus and trouble swallowing.    Eyes:  Negative for photophobia, pain, discharge, itching and visual disturbance.   Respiratory:  Negative for cough, chest tightness, shortness of breath and wheezing.    Cardiovascular:  Negative for chest pain and leg swelling.   Gastrointestinal:  Negative for abdominal distention, abdominal pain, constipation, diarrhea, nausea and vomiting.   Endocrine: Negative for polydipsia, polyphagia and polyuria.   Genitourinary:  Negative for dysuria and frequency.   Musculoskeletal:  Positive for back pain. Negative for arthralgias, myalgias, neck pain and neck stiffness.   Skin:  Negative for color change.   Neurological:  Negative for dizziness, syncope, weakness, numbness and headaches.   Hematological:  Does not bruise/bleed easily.   Psychiatric/Behavioral:  Negative for behavioral problems, confusion, self-injury, sleep disturbance and suicidal ideas. The patient is not nervous/anxious.          Objective     /70 (BP Location: Left arm, Patient Position: Sitting, Cuff Size: Standard)   Pulse 79   Temp 97.6 °F (36.4 °C) (Tympanic)   Resp 18   Ht 6' (1.829 m)   Wt 111 kg (245 lb)   SpO2 97%   BMI 33.23 kg/m²     Physical Exam  Vitals and nursing note reviewed.   Constitutional:        Appearance: Normal appearance. He is well-developed.   HENT:      Head: Normocephalic and atraumatic.      Right Ear: Tympanic membrane, ear canal and external ear normal.      Left Ear: Tympanic membrane, ear canal and external ear normal.      Nose: Nose normal. No congestion or rhinorrhea.      Mouth/Throat:      Mouth: Mucous membranes are moist.      Pharynx: No oropharyngeal exudate or posterior oropharyngeal erythema.   Eyes:      General:         Right eye: No discharge.         Left eye: No discharge.      Conjunctiva/sclera: Conjunctivae normal.      Pupils: Pupils are equal, round, and reactive to light.   Neck:      Thyroid: No thyromegaly.   Cardiovascular:      Rate and Rhythm: Normal rate and regular rhythm.      Pulses: Normal pulses.      Heart sounds: Normal heart sounds.   Pulmonary:      Effort: Pulmonary effort is normal.      Breath sounds: Normal breath sounds. No wheezing or rhonchi.   Abdominal:      General: Bowel sounds are normal. There is no distension.      Palpations: Abdomen is soft.      Tenderness: There is no abdominal tenderness.   Musculoskeletal:         General: No swelling, tenderness or deformity. Normal range of motion.      Cervical back: Normal range of motion and neck supple.      Right lower leg: No edema.      Left lower leg: No edema.   Skin:     General: Skin is warm and dry.      Findings: No erythema or rash.   Neurological:      General: No focal deficit present.      Mental Status: He is alert and oriented to person, place, and time.   Psychiatric:         Mood and Affect: Mood normal.         Behavior: Behavior normal.         Thought Content: Thought content normal.         Judgment: Judgment normal.       Administrative Statements

## 2024-06-04 ENCOUNTER — APPOINTMENT (OUTPATIENT)
Dept: PHYSICAL THERAPY | Age: 54
End: 2024-06-04
Payer: COMMERCIAL

## 2024-06-07 ENCOUNTER — OFFICE VISIT (OUTPATIENT)
Dept: PHYSICAL THERAPY | Age: 54
End: 2024-06-07
Payer: COMMERCIAL

## 2024-06-07 DIAGNOSIS — M54.12 CERVICAL RADICULOPATHY: Primary | ICD-10-CM

## 2024-06-07 DIAGNOSIS — M54.2 CERVICALGIA: ICD-10-CM

## 2024-06-07 PROCEDURE — 97140 MANUAL THERAPY 1/> REGIONS: CPT | Performed by: PHYSICAL THERAPIST

## 2024-06-07 PROCEDURE — 97110 THERAPEUTIC EXERCISES: CPT | Performed by: PHYSICAL THERAPIST

## 2024-06-07 PROCEDURE — 97012 MECHANICAL TRACTION THERAPY: CPT | Performed by: PHYSICAL THERAPIST

## 2024-06-07 NOTE — PROGRESS NOTES
Daily Note/Discharge     Today's date: 2024  Patient name: Danis Forde  : 1970  MRN: [id number]  Referring provider: Troy Cueto MD  Dx:   Encounter Diagnosis     ICD-10-CM    1. Cervical radiculopathy  M54.12       2. Cervicalgia  M54.2           Start Time: 1415  Stop Time: 1505  Total time in clinic (min): 50 minutes    Subjective: Pt reports he is doing well with his neck. No pain. He is pleased with his progress and states he is ready to d/c to PT.      Objective: See treatment diary below      Assessment: Pain free, ROM WNL, no functional limitations.   Goals  Short-term goals:  1.  Independent home exercise program for daily performance. MET  2.  Restore full active pain-free range of motion of the cervical spine. MET  Long-term goals:  1.  Patient will be pain-free for ADLs/IADLs and driving. MET  2.  Patient will be able to sleep without waking due to pain. MET  3.  Patient will be able to manage symptoms independently. MET, complaint with HEP    Plan: Discharge PT with all goals achieved.      POC expires Unit limit Auth Expiration date PT/OT + Visit Limit?   24 4  60 PT/OT/ ST [place]/yr                           Visit/Unit Tracking  AUTH Status:  Date 3/26 3/29 4/2 4/5  4/9 4/11 4/16 4/19 4/22 4/26 4/30 5/3   No Auth Used 1 2 3 4 5 6 7 8 9 10 11 12    Remaining  59 58 57 56 55 54 53 52 51 50 49 48    FOTO 54 (68)          65 (68)      Visit/Unit Tracking  AUTH Status:  Date 5/6 5/10 5/13 5/17 5/21 5/31 6/7        No Auth  Used 13 14 15 16 17 18 19         Remaining  47 46 45 44 43 42 41         FOTO                       Precautions: standard    Access Code: [email or web address]  URL: [email or web address]  Date: 2024  Prepared by: Billie Calhoun    Exercises  - Seated Assisted Cervical Rotation with Towel  - 1 x daily - 7 x weekly - 3 sets - 10 reps  - Mid-Lower Cervical Extension SNAG with Strap  - 1 x daily - 7 x weekly - 3 sets - 10 reps    Manuals 4/30 5/3  5/6 5/10 5/13 5/17  "5/21 5/31 6/7    Manual distraction  JR  JR  [facility]    Upper trap stretch  JR   JR  [facility] MM                  TherEx             HEP/pt ed 5            SNAG rot 10x ea 10x ea 10x 10x  10x 10x 10x 10x    SNAG ext 10x 10x  10xx 10x  10x 10x 10x 10x                 Pulleys w/Cervical rot 30x 30x  30x 30x 30x 30x 30x 30x 30x    Throacic ext  Faom roll on tbale 10x Foam roll on table 10x  20x Foam roll on table 10x  10x 10x  Held nv 10x    Open book 15x ea 15x ea w/ head turn  15 15x ea 15x 15x ea 15x 15x ea 15x    Neckslevel rot 30x Green 3\"x30 30x Green 30x  30x Green 30x 15x 3\" hold 15x 3\" hd 30x    Neckslevel AROM       30x 30x     Neckslevel retraction/ext 30x Green 3\"x30 ea 30x Green 30x 30x Green 30x 30x green 30x green 30x    Prone cervical extension hold NV 5\"x10 w/ towel roll (mod range + tuck) 5\"x10 w/towel 5\"x10 w/towel  10x 10x 10x 10x    Prone ext off table   30\"x3 30\"x3 30\"x3 30\"x5 30\"x5 30\"x5 30\"x5    Tband row Blk 30x Blk 30x 30x Blk 30x  30blk 30x blk 30x blk 30x    Tband Shoulder ext Blk 30x Blk 30x 30x Blk 30x   30x blkTB 30x blk 30x blk 30x    Posture row  NV  30x Blk 30x    30x blk 30x blk 30x    Supine chin tuck in slight flexion  2x10 2x10 2x10                      procedure             Mech traction 25# static 25# static 10'  25#/ 10' 25#/ 10'  25# 25# 10' 25# 10' 25#/ 30                                                                     Gait Training                                       Modalities                                                      "

## 2024-10-04 ENCOUNTER — APPOINTMENT (OUTPATIENT)
Dept: LAB | Facility: CLINIC | Age: 54
End: 2024-10-04
Payer: COMMERCIAL

## 2024-10-04 DIAGNOSIS — Z00.00 ENCOUNTER FOR WELL ADULT EXAM WITHOUT ABNORMAL FINDINGS: ICD-10-CM

## 2024-10-04 DIAGNOSIS — Z12.5 SCREENING PSA (PROSTATE SPECIFIC ANTIGEN): ICD-10-CM

## 2024-10-04 LAB
ALBUMIN SERPL BCG-MCNC: 4.1 G/DL (ref 3.5–5)
ALP SERPL-CCNC: 41 U/L (ref 34–104)
ALT SERPL W P-5'-P-CCNC: 21 U/L (ref 7–52)
ANION GAP SERPL CALCULATED.3IONS-SCNC: 9 MMOL/L (ref 4–13)
AST SERPL W P-5'-P-CCNC: 19 U/L (ref 13–39)
BASOPHILS # BLD AUTO: 0.04 THOUSANDS/ΜL (ref 0–0.1)
BASOPHILS NFR BLD AUTO: 1 % (ref 0–1)
BILIRUB SERPL-MCNC: 0.66 MG/DL (ref 0.2–1)
BUN SERPL-MCNC: 16 MG/DL (ref 5–25)
CALCIUM SERPL-MCNC: 9 MG/DL (ref 8.4–10.2)
CHLORIDE SERPL-SCNC: 102 MMOL/L (ref 96–108)
CHOLEST SERPL-MCNC: 179 MG/DL
CO2 SERPL-SCNC: 27 MMOL/L (ref 21–32)
CREAT SERPL-MCNC: 0.91 MG/DL (ref 0.6–1.3)
EOSINOPHIL # BLD AUTO: 0.1 THOUSAND/ΜL (ref 0–0.61)
EOSINOPHIL NFR BLD AUTO: 1 % (ref 0–6)
ERYTHROCYTE [DISTWIDTH] IN BLOOD BY AUTOMATED COUNT: 11.2 % (ref 11.6–15.1)
GFR SERPL CREATININE-BSD FRML MDRD: 96 ML/MIN/1.73SQ M
GLUCOSE P FAST SERPL-MCNC: 83 MG/DL (ref 65–99)
HCT VFR BLD AUTO: 43.8 % (ref 36.5–49.3)
HDLC SERPL-MCNC: 51 MG/DL
HGB BLD-MCNC: 14.8 G/DL (ref 12–17)
IMM GRANULOCYTES # BLD AUTO: 0.04 THOUSAND/UL (ref 0–0.2)
IMM GRANULOCYTES NFR BLD AUTO: 1 % (ref 0–2)
LDLC SERPL CALC-MCNC: 106 MG/DL (ref 0–100)
LYMPHOCYTES # BLD AUTO: 1.64 THOUSANDS/ΜL (ref 0.6–4.47)
LYMPHOCYTES NFR BLD AUTO: 23 % (ref 14–44)
MCH RBC QN AUTO: 31.2 PG (ref 26.8–34.3)
MCHC RBC AUTO-ENTMCNC: 33.8 G/DL (ref 31.4–37.4)
MCV RBC AUTO: 92 FL (ref 82–98)
MONOCYTES # BLD AUTO: 0.71 THOUSAND/ΜL (ref 0.17–1.22)
MONOCYTES NFR BLD AUTO: 10 % (ref 4–12)
NEUTROPHILS # BLD AUTO: 4.59 THOUSANDS/ΜL (ref 1.85–7.62)
NEUTS SEG NFR BLD AUTO: 64 % (ref 43–75)
NRBC BLD AUTO-RTO: 0 /100 WBCS
PLATELET # BLD AUTO: 275 THOUSANDS/UL (ref 149–390)
PMV BLD AUTO: 11.3 FL (ref 8.9–12.7)
POTASSIUM SERPL-SCNC: 4.2 MMOL/L (ref 3.5–5.3)
PROT SERPL-MCNC: 6.6 G/DL (ref 6.4–8.4)
RBC # BLD AUTO: 4.74 MILLION/UL (ref 3.88–5.62)
SODIUM SERPL-SCNC: 138 MMOL/L (ref 135–147)
TRIGL SERPL-MCNC: 112 MG/DL
WBC # BLD AUTO: 7.12 THOUSAND/UL (ref 4.31–10.16)

## 2024-10-04 PROCEDURE — 36415 COLL VENOUS BLD VENIPUNCTURE: CPT

## 2024-10-04 PROCEDURE — 80053 COMPREHEN METABOLIC PANEL: CPT

## 2024-10-04 PROCEDURE — 80061 LIPID PANEL: CPT

## 2024-10-04 PROCEDURE — 85025 COMPLETE CBC W/AUTO DIFF WBC: CPT

## 2024-10-04 PROCEDURE — 84153 ASSAY OF PSA TOTAL: CPT

## 2024-10-07 NOTE — RESULT ENCOUNTER NOTE
Please let him know overall his labs were normal, his LDL has trended down to 106, improved from last lab work.  He remains with his PSA pending, will reach out if abnormal

## 2024-10-09 LAB — MISCELLANEOUS LAB TEST RESULT: NORMAL

## 2025-03-12 ENCOUNTER — OFFICE VISIT (OUTPATIENT)
Dept: FAMILY MEDICINE CLINIC | Facility: CLINIC | Age: 55
End: 2025-03-12
Payer: COMMERCIAL

## 2025-03-12 VITALS
HEIGHT: 72 IN | OXYGEN SATURATION: 98 % | TEMPERATURE: 96.6 F | BODY MASS INDEX: 32.51 KG/M2 | HEART RATE: 90 BPM | RESPIRATION RATE: 20 BRPM | SYSTOLIC BLOOD PRESSURE: 138 MMHG | DIASTOLIC BLOOD PRESSURE: 78 MMHG | WEIGHT: 240 LBS

## 2025-03-12 DIAGNOSIS — J03.90 TONSILLITIS: Primary | ICD-10-CM

## 2025-03-12 PROCEDURE — 99213 OFFICE O/P EST LOW 20 MIN: CPT | Performed by: NURSE PRACTITIONER

## 2025-03-12 RX ORDER — AMOXICILLIN 875 MG/1
875 TABLET, COATED ORAL 2 TIMES DAILY
Qty: 14 TABLET | Refills: 0 | Status: SHIPPED | OUTPATIENT
Start: 2025-03-12 | End: 2025-03-19

## 2025-03-12 NOTE — PROGRESS NOTES
Name: Danis Forde      : 1970      MRN: [id number]  Encounter Provider: Isabelle Carey DNP  Encounter Date: 3/12/2025   Encounter department: [facility]  :  Assessment & Plan  Tonsillitis    Orders:    amoxicillin (AMOXIL) 875 mg tablet; Take 1 tablet (875 mg total) by mouth 2 (two) times a day for 7 days           History of Present Illness   Will be traveling to [place] this week.   He reports a sore throat, mild congestion. Does have known hx of strep. Last week he reports travel, [place]  No fever or chills. No ill contacts at home  Has not tried OTC    Sore Throat   Pertinent negatives include no abdominal pain, congestion, coughing, diarrhea, ear discharge, ear pain, headaches, neck pain, shortness of breath, trouble swallowing or vomiting.     Review of Systems   Constitutional:  Negative for activity change, chills, fatigue and fever.   HENT:  Positive for sore throat. Negative for congestion, ear discharge, ear pain, sinus pressure, sinus pain, tinnitus and trouble swallowing.    Eyes:  Negative for photophobia, pain, discharge, itching and visual disturbance.   Respiratory:  Negative for cough, chest tightness, shortness of breath and wheezing.    Cardiovascular:  Negative for chest pain and leg swelling.   Gastrointestinal:  Negative for abdominal distention, abdominal pain, constipation, diarrhea, nausea and vomiting.   Endocrine: Negative for polydipsia, polyphagia and polyuria.   Genitourinary:  Negative for dysuria and frequency.   Musculoskeletal:  Negative for arthralgias, myalgias, neck pain and neck stiffness.   Skin:  Negative for color change.   Neurological:  Negative for dizziness, syncope, weakness, numbness and headaches.   Hematological:  Does not bruise/bleed easily.   Psychiatric/Behavioral:  Negative for behavioral problems, confusion, self-injury, sleep disturbance and suicidal ideas. The patient is not nervous/anxious.        Objective   BP  138/78 (BP Location: Left arm, Patient Position: Sitting, Cuff Size: Large)   Pulse 90   Temp (!) 96.6 °F (35.9 °C) (Tympanic)   Resp 20   Ht 6' (1.829 m)   Wt 109 kg (240 lb)   SpO2 98%   BMI 32.55 kg/m²      Physical Exam  Vitals and nursing note reviewed.   Constitutional:       Appearance: He is well-developed.   HENT:      Head: Normocephalic and atraumatic.      Right Ear: Tympanic membrane and ear canal normal.      Left Ear: Tympanic membrane and ear canal normal.      Nose: Congestion present.      Mouth/Throat:      Pharynx: Posterior oropharyngeal erythema present.      Tonsils: Tonsillar exudate present.   Eyes:      Conjunctiva/sclera: Conjunctivae normal.   Cardiovascular:      Rate and Rhythm: Normal rate and regular rhythm.   Musculoskeletal:      Cervical back: Normal range of motion and neck supple.   Skin:     General: Skin is warm.   Neurological:      Mental Status: He is alert.

## 2025-04-29 ENCOUNTER — OFFICE VISIT (OUTPATIENT)
Dept: OBGYN CLINIC | Facility: CLINIC | Age: 55
End: 2025-04-29
Payer: COMMERCIAL

## 2025-04-29 VITALS
BODY MASS INDEX: 32.64 KG/M2 | TEMPERATURE: 97.9 F | HEIGHT: 72 IN | WEIGHT: 241 LBS | OXYGEN SATURATION: 97 % | HEART RATE: 101 BPM

## 2025-04-29 DIAGNOSIS — S46.012A ROTATOR CUFF STRAIN, LEFT, INITIAL ENCOUNTER: ICD-10-CM

## 2025-04-29 DIAGNOSIS — M25.512 ACUTE PAIN OF LEFT SHOULDER: Primary | ICD-10-CM

## 2025-04-29 PROCEDURE — 99214 OFFICE O/P EST MOD 30 MIN: CPT | Performed by: FAMILY MEDICINE

## 2025-04-29 PROCEDURE — 20610 DRAIN/INJ JOINT/BURSA W/O US: CPT | Performed by: FAMILY MEDICINE

## 2025-04-29 RX ORDER — TRIAMCINOLONE ACETONIDE 40 MG/ML
40 INJECTION, SUSPENSION INTRA-ARTICULAR; INTRAMUSCULAR
Status: COMPLETED | OUTPATIENT
Start: 2025-04-29 | End: 2025-04-29

## 2025-04-29 RX ORDER — LIDOCAINE HYDROCHLORIDE 10 MG/ML
4 INJECTION, SOLUTION INFILTRATION; PERINEURAL
Status: COMPLETED | OUTPATIENT
Start: 2025-04-29 | End: 2025-04-29

## 2025-04-29 RX ADMIN — LIDOCAINE HYDROCHLORIDE 4 ML: 10 INJECTION, SOLUTION INFILTRATION; PERINEURAL at 09:30

## 2025-04-29 RX ADMIN — TRIAMCINOLONE ACETONIDE 40 MG: 40 INJECTION, SUSPENSION INTRA-ARTICULAR; INTRAMUSCULAR at 09:30

## 2025-04-29 NOTE — PROGRESS NOTES
[facility] [address] [place]  [facility] [id number] [phone]      Assessment:  1. Acute pain of left shoulder  -     XR shoulder 2+ vw left; Future; Expected date: 04/29/2025  -     Ambulatory Referral to Physical Therapy; Future  2. Rotator cuff strain, left, initial encounter  -     Ambulatory Referral to Physical Therapy; Future    Assessment & Plan  Acute pain of left shoulder    Orders:    XR shoulder 2+ vw left; Future    Ambulatory Referral to Physical Therapy; Future    Rotator cuff strain, left, initial encounter    Orders:    Ambulatory Referral to Physical Therapy; Future      Patient Instructions   F/u 6 wks  Begin physical therapy  Home exercises  Icing/heat/OTC pain meds as needed.  L shoulder SA steroid injection.    Plan:  Patient Instructions   F/u 6 wks  Begin physical therapy  Home exercises  Icing/heat/OTC pain meds as needed.  L shoulder SA steroid injection.     Return in about 6 weeks (around 6/10/2025) for Recheck.    Chief Complaint:  Chief Complaint   Patient presents with    Left Shoulder - Pain       Subjective:   HPI    Patient ID: Danis Forde is a 54 y.o. male     Here c/o L shoulder pain  Pain started about 3 wks ago  Reorganizing his garage and moving boxes around is head and tried to lift it off the shelf and felt pain in his L shoulder pain  Sharp pain  Pain moving- above shoulder  Better at rest.  Motrin- Prn- helps a little    Review of Systems   Constitutional:  Negative for fatigue and fever.   Respiratory:  Negative for shortness of breath.    Cardiovascular:  Negative for chest pain.   Gastrointestinal:  Negative for abdominal pain and nausea.   Genitourinary:  Negative for dysuria.   Musculoskeletal:  Positive for arthralgias.   Skin:  Negative for rash and wound.   Neurological:  Negative for weakness and headaches.       Objective:  Vitals:  Pulse 101   Temp 97.9 °F (36.6 °C)   Ht 6' (1.829 m)   Wt 109 kg (241 lb)   " SpO2 97%   BMI 32.69 kg/m²     The following portions of the patient's history were reviewed and updated as appropriate: allergies, current medications, past family history, past medical history, past social history, past surgical history, and problem list.    Physical exam:  Physical Exam  Constitutional:       Appearance: Normal appearance. He is normal weight.   Eyes:      Extraocular Movements: Extraocular movements intact.   Pulmonary:      Effort: Pulmonary effort is normal.   Musculoskeletal:      Cervical back: Normal range of motion.   Skin:     General: Skin is warm and dry.   Neurological:      General: No focal deficit present.      Mental Status: He is alert and oriented to person, place, and time. Mental status is at baseline.   Psychiatric:         Mood and Affect: Mood normal.         Behavior: Behavior normal.         Thought Content: Thought content normal.         Judgment: Judgment normal.       Left Shoulder Exam     Tenderness   The patient is experiencing no tenderness.     Range of Motion   Active abduction:  130 abnormal   Passive abduction:  abnormal   Extension:  normal   External rotation:  normal   Forward flexion:  abnormal   Internal rotation 0 degrees:  normal   Internal rotation 90 degrees:  normal     Muscle Strength   The patient has normal left shoulder strength.    Tests   Chi test: positive  Impingement: positive     Comments:  Pos empty can/push off test          Large joint arthrocentesis: L subacromial bursa  [place] Protocol:  procedure performed by consultantConsent: Verbal consent obtained.  Risks and benefits: risks, benefits and alternatives were discussed  Consent given by: patient  Time out: Immediately prior to procedure a \"time out\" was called to verify the correct patient, procedure, equipment, support staff and site/side marked as required.  Timeout called at: 4/29/2025 9:43 AM.  Site marked: the operative site was marked  Supporting " Documentation  Indications: pain     Is this a Visco injection? NoProcedure Details  Location: shoulder - L subacromial bursa  Preparation: Patient was prepped and draped in the usual sterile fashion  Needle size: 25 G  Ultrasound guidance: no  Approach: posterolateral  Medications administered: 40 mg triamcinolone acetonide 40 mg/mL; 4 mL lidocaine 1 %    Patient tolerance: patient tolerated the procedure well with no immediate complications  Dressing:  Sterile dressing applied          I have personally reviewed pertinent films in PACS and my interpretation is XR L shoulder-  no fx.  Mildly elevated humerus.      Troy Cueto MD

## 2025-04-29 NOTE — PATIENT INSTRUCTIONS
F/u 6 wks  Begin physical therapy  Home exercises  Icing/heat/OTC pain meds as needed.  L shoulder SA steroid injection.

## 2025-05-07 ENCOUNTER — EVALUATION (OUTPATIENT)
Dept: PHYSICAL THERAPY | Age: 55
End: 2025-05-07
Payer: COMMERCIAL

## 2025-05-07 DIAGNOSIS — S46.012A ROTATOR CUFF STRAIN, LEFT, INITIAL ENCOUNTER: Primary | ICD-10-CM

## 2025-05-07 PROCEDURE — 97110 THERAPEUTIC EXERCISES: CPT | Performed by: PHYSICAL THERAPIST

## 2025-05-07 PROCEDURE — 97162 PT EVAL MOD COMPLEX 30 MIN: CPT | Performed by: PHYSICAL THERAPIST

## 2025-05-07 PROCEDURE — 97140 MANUAL THERAPY 1/> REGIONS: CPT | Performed by: PHYSICAL THERAPIST

## 2025-05-07 NOTE — PROGRESS NOTES
PT Evaluation     Today's date: 2025  Patient name: Danis Forde  : 1970  MRN: [id number]  Referring provider: Troy Cueto MD  Dx:   Encounter Diagnosis     ICD-10-CM    1. Rotator cuff strain, left, initial encounter  S46.012A           Start Time: 1530  Stop Time: 1630  Total time in clinic (min): 60 minutes    Assessment  Impairments: abnormal muscle tone, abnormal or restricted ROM, abnormal movement, activity intolerance, impaired physical strength, lacks appropriate home exercise program, pain with function, scapular dyskinesis, poor posture  and poor body mechanics    Assessment details: Danis Forde is a 54 y.o. male who presents with pain, decreased strength, decreased ROM, and postural dysfunction. Due to these impairments, Patient has difficulty performing a/iadls. Patient's clinical presentation is consistent with their referring diagnosis of left rotator cuff strain. Patient would benefit from skilled physical therapy to address their aforementioned impairments, improve their level of function and to improve their overall quality of life.  Understanding of Dx/Px/POC: good     Prognosis: good    Goals  ST-3 WEEKS  1.  Decrease pain by 2 points on VAS at its worst.  2.  Increase ROM by > 5 deg in all deficients planes.  3.  Increase UE by 1/2 MMT grade in all deficient planes.    LT-6 WEEKS  1. Patient to be independent with a/iadls especially with reaching,and sleeping  2. Increase functional activities for leisure and home activities to previous LOF.  3. Independent with HEP and/or fitness program.    Plan  Patient would benefit from: skilled physical therapy  Planned modality interventions: cryotherapy, electrical stimulation/Russian stimulation, thermotherapy: hydrocollator packs and unattended electrical stimulation    Planned therapy interventions: activity modification, behavior modification, body mechanics training, aquatic therapy, flexibility, functional ROM exercises,  home exercise program, IADL retraining, joint mobilization, manual therapy, neuromuscular re-education, patient education, postural training, strengthening, stretching, therapeutic activities and therapeutic exercise    Frequency: 2x week (2-3x week)  Duration in weeks: 12  Plan of Care beginning date: 2025  Plan of Care expiration date: 2025  Treatment plan discussed with: patient      Subjective Evaluation    History of Present Illness  Mechanism of injury: Left shoulder pain x 1 month secondary to lifting boxes in garage felt pain in left pec but later noted left bicep pain, recent injection noted relief works at a desk          Not a recurrent problem   Quality of life: good    Patient Goals  Patient goals for therapy: decreased pain, increased motion, increased strength and independence with ADLs/IADLs    Pain  Current pain ratin  At best pain rating: 3  At worst pain ratin  Quality: sharp and dull ache  Relieving factors: heat, medications, ice and rest  Aggravating factors: overhead activity and lifting  Progression: improved    Hand dominance: right      Diagnostic Tests  X-ray: normal  Treatments  Previous treatment: injection treatment and medication      Objective     Static Posture     Head  Forward.    Thoracic Spine  Hyperkyphosis.    Tenderness     Left Shoulder   Tenderness in the bicipital groove and supraspinatus tendon.     Active Range of Motion   Left Shoulder   Flexion: 145 degrees   External rotation 90°: 80 degrees   Internal rotation 90°: 45 degrees     Right Shoulder   Flexion: 165 degrees   External rotation 90°: 90 degrees  Internal rotation 90°: 60 degrees     Strength/Myotome Testing     Left Shoulder     Planes of Motion   Flexion: 4-   Extension: 4-   Abduction: 4-   Adduction: 4+   External rotation at 0°: 4-   Internal rotation at 0°: 4+     Isolated Muscles   Supraspinatus: 3+     Tests     Left Shoulder   Positive anterior slide , empty can, Hawkin's, Speed's and  anterior slide .   Negative drop arm and lift-off.              Precautions: standard  POC expires Unit limit Auth Expiration date PT/OT + Visit Limit?                                 Visit/Unit Tracking  AUTH Status:  Date 5/7               Used 1               Remaining                 FOTO                      Manuals 5/7                         MT left shoulder 10 min                                      Neuro Re-Ed                                                                                                        Ther Ex             UBE 4 min            pulleys 30x            Cane FF 30x            BTB rows 30x            GTB ER 30x            GTB IR 30x                                      Ther Activity                                       Gait Training                                       Modalities

## 2025-05-09 ENCOUNTER — OFFICE VISIT (OUTPATIENT)
Dept: PHYSICAL THERAPY | Age: 55
End: 2025-05-09
Attending: FAMILY MEDICINE
Payer: COMMERCIAL

## 2025-05-09 DIAGNOSIS — S46.012A ROTATOR CUFF STRAIN, LEFT, INITIAL ENCOUNTER: Primary | ICD-10-CM

## 2025-05-09 PROCEDURE — 97110 THERAPEUTIC EXERCISES: CPT

## 2025-05-09 PROCEDURE — 97140 MANUAL THERAPY 1/> REGIONS: CPT

## 2025-05-09 NOTE — PROGRESS NOTES
Daily Note     Today's date: 2025  Patient name: Danis Forde  : 1970  MRN: [id number]  Referring provider: Troy Cueto MD  Dx:   Encounter Diagnosis     ICD-10-CM    1. Rotator cuff strain, left, initial encounter  S46.012A           Start Time: 1345  Stop Time: 1430  Total time in clinic (min): 45 minutes    Subjective: Reports feeling some improvement since receiving injection and starting PT. States he can lift his arm with greater ease but is still limited.       Objective: See treatment diary below      Assessment:  Cues for carryover of program and to ensure proper performance. Added landmine row with cues for scap setting and to maintain elbow position, avoiding shoulder IR. Also added prone IYT for scap musculature strength and mobility. Will continue to progress as tolerated. Patient would benefit from continued PT      Plan: Continue per plan of care.      Precautions: standard  POC expires Unit limit Auth Expiration date PT/OT + Visit Limit?                                 Visit/Unit Tracking  AUTH Status:  Date               Used 1 2              Remaining                 FOTO                      Manuals                          MT left shoulder 10 min 10'                                      Neuro Re-Ed                                                                                                        Ther Ex             UBE 4 min 5'            pulleys 30x 30x            Cane FF 30x 30x           Supine stability circles cw/ccw  30x ea           Supine serratus press  30x            BTB rows 30x 30x           BTB ext  30x            GTB ER 30x 30x           GTB IR 30x 30x            Prone IYT   2x10            Landmine press (cane on half wall)  2# 3x10                         Ther Activity                                       Gait Training                                       Modalities

## 2025-05-14 ENCOUNTER — OFFICE VISIT (OUTPATIENT)
Dept: PHYSICAL THERAPY | Age: 55
End: 2025-05-14
Attending: FAMILY MEDICINE
Payer: COMMERCIAL

## 2025-05-14 DIAGNOSIS — S46.012A ROTATOR CUFF STRAIN, LEFT, INITIAL ENCOUNTER: Primary | ICD-10-CM

## 2025-05-14 PROCEDURE — 97140 MANUAL THERAPY 1/> REGIONS: CPT | Performed by: PHYSICAL THERAPIST

## 2025-05-14 PROCEDURE — 97110 THERAPEUTIC EXERCISES: CPT | Performed by: PHYSICAL THERAPIST

## 2025-05-14 NOTE — PROGRESS NOTES
Daily Note     Today's date: 2025  Patient name: Danis Forde  : 1970  MRN: [id number]  Referring provider: Troy Cueto MD  Dx:   Encounter Diagnosis     ICD-10-CM    1. Rotator cuff strain, left, initial encounter  S46.012A           Start Time: 1345  Stop Time: 1430  Total time in clinic (min): 45 minutes    Subjective: patient reports left shoulder is sore at 4/10      Objective: See treatment diary below      Assessment: Tolerated treatment fair. Patient demonstrated fatigue post treatment, exhibited good technique with therapeutic exercises, and would benefit from continued PT, still has pain with abduction movements, Patient demonstrates a tight posterior capsule with an increased anterior glide       Plan: Progress treatment as tolerated.       Precautions: standard  POC expires Unit limit Auth Expiration date PT/OT + Visit Limit?                                 Visit/Unit Tracking  AUTH Status:  Date              Used 1 2 3             Remaining                 FOTO                      Manuals                        MT left shoulder 10 min 10'  15 min                                    Neuro Re-Ed                                                                                                        Ther Ex             UBE 4 min 5'  5 min          pulleys 30x 30x  30x          Cane FF 30x 30x 30x          Supine stability circles cw/ccw  30x ea 30x          Supine serratus press  30x  30x          BTB rows 30x 30x 30x          BTB ext  30x  30x          GTB ER 30x 30x 30x          GTB IR 30x 30x  30x          Prone IYT   2x10  2x10          Landmine press (cane on half wall)  2# 3x10  2/30                       Ther Activity                                       Gait Training                                       Modalities

## 2025-05-16 ENCOUNTER — OFFICE VISIT (OUTPATIENT)
Dept: PHYSICAL THERAPY | Age: 55
End: 2025-05-16
Attending: FAMILY MEDICINE
Payer: COMMERCIAL

## 2025-05-16 DIAGNOSIS — S46.012A ROTATOR CUFF STRAIN, LEFT, INITIAL ENCOUNTER: Primary | ICD-10-CM

## 2025-05-16 PROCEDURE — 97140 MANUAL THERAPY 1/> REGIONS: CPT

## 2025-05-16 PROCEDURE — 97110 THERAPEUTIC EXERCISES: CPT

## 2025-05-16 NOTE — PROGRESS NOTES
Daily Note     Today's date: 2025  Patient name: Danis Forde  : 1970  MRN: [id number]  Referring provider: Troy Cueto MD  Dx:   Encounter Diagnosis     ICD-10-CM    1. Rotator cuff strain, left, initial encounter  S46.012A                      Subjective: Shoulder is doing okay, getting better.       Objective: See treatment diary below      Assessment: Tolerated treatment and additions well, fatigue with lateral raises. Emphasis on form and RTC engagement. Patient would benefit from continued PT.       Plan: Continue per plan of care.      Precautions: standard  POC expires Unit limit Auth Expiration date PT/OT + Visit Limit?                                 Visit/Unit Tracking  AUTH Status:  Date             Used 1 2 3 4            Remaining                 FOTO                      Manuals                       MT left shoulder 10 min 10'  15 min 15 min                                   Neuro Re-Ed                                                                                                        Ther Ex             UBE 4 min 5'  5 min 5 min          pulleys 30x 30x  30x 30x         Cane FF 30x 30x 30x 20x 2#          Supine stability circles cw/ccw  30x ea 30x 30x 2#          Supine serratus press  30x  30x 2# 30x         BTB rows 30x 30x 30x 30x         BTB ext  30x  30x 30x         GTB ER 30x 30x 30x 30x         GTB IR 30x 30x  30x 30x         Prone IYT   2x10  2x10 2x10         Landmine press (cane on half wall)  2# 3x10  2/30 2#/30         Standing abd and scaption    2# ff 20x 0# 10x scap         Ther Activity             Pegs red    2x                      Gait Training                                       Modalities

## 2025-05-27 ENCOUNTER — OFFICE VISIT (OUTPATIENT)
Dept: PHYSICAL THERAPY | Age: 55
End: 2025-05-27
Attending: FAMILY MEDICINE
Payer: COMMERCIAL

## 2025-05-27 DIAGNOSIS — S46.012A ROTATOR CUFF STRAIN, LEFT, INITIAL ENCOUNTER: Primary | ICD-10-CM

## 2025-05-27 PROCEDURE — 97110 THERAPEUTIC EXERCISES: CPT | Performed by: PHYSICAL THERAPIST

## 2025-05-27 PROCEDURE — 97140 MANUAL THERAPY 1/> REGIONS: CPT | Performed by: PHYSICAL THERAPIST

## 2025-05-27 NOTE — PROGRESS NOTES
Daily Note     Today's date: 2025  Patient name: Danis Forde  : 1970  MRN: [id number]  Referring provider: Troy Cueto MD  Dx:   Encounter Diagnosis     ICD-10-CM    1. Rotator cuff strain, left, initial encounter  S46.012A           Start Time: 1300  Stop Time: 1345  Total time in clinic (min): 45 minutes    Subjective: patient reports left shoulder pain is 4/10      Objective: See treatment diary below      Assessment: Tolerated treatment fair. Patient demonstrated fatigue post treatment, exhibited good technique with therapeutic exercises, and would benefit from continued PT, still has pain with abduction movements and patient demonstrates a tight posterior capsule with an increased anterior glide       Plan: Progress treatment as tolerated.       Precautions: standard  POC expires Unit limit Auth Expiration date PT/OT + Visit Limit?                                 Visit/Unit Tracking  AUTH Status:  Date            Used 1 2 3 4 5           Remaining                 FOTO                      Manuals                      MT left shoulder 10 min 10'  15 min 15 min 15 min                                  Neuro Re-Ed                                                                                                        Ther Ex             UBE 4 min 5'  5 min 5 min  5 min        pulleys 30x 30x  30x 30x 30x        Cane FF 30x 30x 30x 20x 2#  30x        Supine stability circles cw/ccw  30x ea 30x 30x 2#  30x        Supine serratus press  30x  30x 2# 30x 30x        BTB rows 30x 30x 30x 30x 30x        BTB ext  30x  30x 30x 30x        GTB ER 30x 30x 30x 30x 30x        GTB IR 30x 30x  30x 30x 30x        Prone IYT   2x10  2x10 2x10 30x        Landmine press (cane on half wall)  2# 3x10  2/30 2#/30 2/30        Standing abd and scaption    2# ff 20x 0# 10x scap 2/20        Ther Activity             Pegs red    2x 2x                     Gait Training                                        Modalities

## 2025-05-30 ENCOUNTER — OFFICE VISIT (OUTPATIENT)
Dept: PHYSICAL THERAPY | Age: 55
End: 2025-05-30
Attending: FAMILY MEDICINE
Payer: COMMERCIAL

## 2025-05-30 DIAGNOSIS — S46.012A ROTATOR CUFF STRAIN, LEFT, INITIAL ENCOUNTER: Primary | ICD-10-CM

## 2025-05-30 PROCEDURE — 97140 MANUAL THERAPY 1/> REGIONS: CPT

## 2025-05-30 PROCEDURE — 97110 THERAPEUTIC EXERCISES: CPT

## 2025-05-30 NOTE — PROGRESS NOTES
Daily Note     Today's date: 2025  Patient name: Danis Forde  : 1970  MRN: [id number]  Referring provider: Troy Cueto MD  Dx:   Encounter Diagnosis     ICD-10-CM    1. Rotator cuff strain, left, initial encounter  S46.012A           Start Time: 945  Stop Time: 1030  Total time in clinic (min): 45 minutes    Subjective: Reports some pain with active abduction, but other movements have improved.       Objective: See treatment diary below  Patient was seen 1:1 for 30 min.     Assessment: Added scap stability exercises including wall walks and flutters to tolerance. Fatigue noted and rest breaks needed, but denies pain. Will continue to progress with additional focus on scap strengthening and muscular endurance. Patient would benefit from continued PT      Plan: Continue per plan of care.      Precautions: standard  POC expires Unit limit Auth Expiration date PT/OT + Visit Limit?   25                              Visit/Unit Tracking  AUTH Status:  Date           Used 1 2 3 4 5 6          Remaining                 FOTO                      Manuals                     MT left shoulder 10 min 10'  15 min 15 min 15 min 8'                                 Neuro Re-Ed             Scap wall walks      YTB 7x       Scap flutter to 90       YTB 5x                                                                         Ther Ex             UBE 4 min 5'  5 min 5 min  5 min 5'       pulleys 30x 30x  30x 30x 30x 30x        Cane FF 30x 30x 30x 20x 2#  30x 2# 30x       Supine stability circles cw/ccw  30x ea 30x 30x 2#  30x 2# 30x        Supine serratus press  30x  30x 2# 30x 30x 2#        BTB rows 30x 30x 30x 30x 30x Black 30x       BTB ext  30x  30x 30x 30x Black 30x       GTB ER 30x 30x 30x 30x 30x 30x       GTB IR 30x 30x  30x 30x 30x 30x        Prone IYT   2x10  2x10 2x10 30x 30x        Landmine press (cane on half wall)  2# 3x10  2/30 2#/30 2/30 2# 3x10         Standing abd and scaption    2# ff 20x 0# 10x scap 2/20 2# 2x10 ea        Ther Activity             Pegs red    2x 2x 2x                    Gait Training                                       Modalities

## 2025-06-03 ENCOUNTER — OFFICE VISIT (OUTPATIENT)
Dept: PHYSICAL THERAPY | Age: 55
End: 2025-06-03
Attending: FAMILY MEDICINE
Payer: COMMERCIAL

## 2025-06-03 DIAGNOSIS — S46.012A ROTATOR CUFF STRAIN, LEFT, INITIAL ENCOUNTER: Primary | ICD-10-CM

## 2025-06-03 PROCEDURE — 97110 THERAPEUTIC EXERCISES: CPT

## 2025-06-03 PROCEDURE — 97140 MANUAL THERAPY 1/> REGIONS: CPT

## 2025-06-03 NOTE — PROGRESS NOTES
Daily Note     Today's date: 6/3/2025  Patient name: Danis Forde  : 1970  MRN: [id number]  Referring provider: Troy Cueto MD  Dx:   Encounter Diagnosis     ICD-10-CM    1. Rotator cuff strain, left, initial encounter  S46.012A           Start Time: 915  Stop Time: 955  Total time in clinic (min): 40 minutes    Subjective: Reports overall improvement       Objective: See treatment diary below      Assessment: Improvements in ROM t/o. Fatigues quickly with tband scap exercises. Would benefit from additional strengthening with focus on scap musculature. Patient would benefit from continued PT      Plan: Continue per plan of care.      Precautions: standard  POC expires Unit limit Auth Expiration date PT/OT + Visit Limit?   25                              Visit/Unit Tracking  AUTH Status:  Date 5/7 5/9 5/14 5/16 5/27 5/30 6/3         Used 1 2 3 4 5 6 7         Remaining                 FOTO                      Manuals  6/3                   MT left shoulder 10 min 10'  15 min 15 min 15 min 8' 8'                                Neuro Re-Ed             Scap wall walks      YTB 7x YTB 7x      Scap flutter to 90       YTB 5x  YTB x                                                                       Ther Ex             UBE 4 min 5'  5 min 5 min  5 min 5' 5'      pulleys 30x 30x  30x 30x 30x 30x  30x       Cane FF 30x 30x 30x 20x 2#  30x 2# 30x 2# 30x      Supine stability circles cw/ccw  30x ea 30x 30x 2#  30x 2# 30x  2# 30x       Supine serratus press  30x  30x 2# 30x 30x 2#  2# 30x       BTB rows 30x 30x 30x 30x 30x Black 30x Black 30x      BTB ext  30x  30x 30x 30x Black 30x Black       GTB ER 30x 30x 30x 30x 30x 30x 30x      GTB IR 30x 30x  30x 30x 30x 30x  30x       Prone IYT   2x10  2x10 2x10 30x 30x        Landmine press (cane on half wall)  2# 3x10  2/30 2#/30 2/30 2# 3x10  2# 3x10       Standing abd and scaption    2# ff 20x 0# 10x scap 2/20 2# 2x10 ea  2# 2x10 ea        No monies        NV                                 Ther Activity             Pegs red    2x 2x 2x 2x                   Gait Training                                       Modalities

## 2025-06-09 ENCOUNTER — EVALUATION (OUTPATIENT)
Dept: PHYSICAL THERAPY | Age: 55
End: 2025-06-09
Attending: FAMILY MEDICINE
Payer: COMMERCIAL

## 2025-06-09 ENCOUNTER — OFFICE VISIT (OUTPATIENT)
Dept: FAMILY MEDICINE CLINIC | Facility: CLINIC | Age: 55
End: 2025-06-09
Payer: COMMERCIAL

## 2025-06-09 VITALS
HEART RATE: 74 BPM | BODY MASS INDEX: 31.56 KG/M2 | HEIGHT: 72 IN | WEIGHT: 233 LBS | DIASTOLIC BLOOD PRESSURE: 82 MMHG | SYSTOLIC BLOOD PRESSURE: 122 MMHG | RESPIRATION RATE: 20 BRPM | TEMPERATURE: 98.2 F | OXYGEN SATURATION: 99 %

## 2025-06-09 DIAGNOSIS — S46.012A ROTATOR CUFF STRAIN, LEFT, INITIAL ENCOUNTER: Primary | ICD-10-CM

## 2025-06-09 DIAGNOSIS — Z00.00 ENCOUNTER FOR WELL ADULT EXAM WITHOUT ABNORMAL FINDINGS: Primary | ICD-10-CM

## 2025-06-09 PROCEDURE — 97110 THERAPEUTIC EXERCISES: CPT | Performed by: PHYSICAL THERAPIST

## 2025-06-09 PROCEDURE — 99396 PREV VISIT EST AGE 40-64: CPT | Performed by: NURSE PRACTITIONER

## 2025-06-09 PROCEDURE — 97140 MANUAL THERAPY 1/> REGIONS: CPT | Performed by: PHYSICAL THERAPIST

## 2025-06-09 NOTE — PROGRESS NOTES
PT Re-Evaluation     Today's date: 2025  Patient name: Danis Forde  : 1970  MRN: [id number]  Referring provider: Troy Cueto MD  Dx:   Encounter Diagnosis     ICD-10-CM    1. Rotator cuff strain, left, initial encounter  S46.012A PT plan of care cert/re-cert          Start Time: 0945  Stop Time: 1030  Total time in clinic (min): 45 minutes    Assessment  Impairments: abnormal muscle tone, abnormal or restricted ROM, abnormal movement, activity intolerance, impaired physical strength, lacks appropriate home exercise program, pain with function, scapular dyskinesis, poor posture  and poor body mechanics    Assessment details: 25, patient demonstrates increased ROM and strength to left shoulder and can now reach overhead much easier. Patient also notes improved FOTO scores,  Understanding of Dx/Px/POC: good     Prognosis: good    Goals  ST-3 WEEKS  1.  Decrease pain by 2 points on VAS at its worst. MET  2.  Increase ROM by > 5 deg in all deficients planes. MET  3.  Increase UE by 1/2 MMT grade in all deficient planes. WORKING TOWARDS    LT-6 WEEKS  1. Patient to be independent with a/iadls especially with reaching,and sleeping, WORKING TOWARDS  2. Increase functional activities for leisure and home activities to previous LOF.  3. Independent with HEP and/or fitness program.    Plan  Patient would benefit from: skilled physical therapy  Planned modality interventions: cryotherapy, electrical stimulation/Russian stimulation, thermotherapy: hydrocollator packs and unattended electrical stimulation    Planned therapy interventions: activity modification, behavior modification, body mechanics training, aquatic therapy, flexibility, functional ROM exercises, home exercise program, IADL retraining, joint mobilization, manual therapy, neuromuscular re-education, patient education, postural training, strengthening, stretching, therapeutic activities and therapeutic exercise    Frequency: 2x week  (2-3x week)  Duration in weeks: 6  Plan of Care beginning date: 2025  Plan of Care expiration date: 2025  Treatment plan discussed with: patient      Subjective Evaluation    History of Present Illness  Mechanism of injury: 25, patient reports  [ain and increased ROM after 9 PT visits          Not a recurrent problem   Quality of life: good    Patient Goals  Patient goals for therapy: decreased pain, increased motion, increased strength and independence with ADLs/IADLs    Pain  Current pain ratin  At best pain ratin  At worst pain ratin  Quality: sharp and dull ache  Relieving factors: heat, medications, ice and rest  Aggravating factors: overhead activity and lifting  Progression: improved    Hand dominance: right      Diagnostic Tests  X-ray: normal  Treatments  Previous treatment: injection treatment and medication        Objective     Static Posture     Head  Forward.    Thoracic Spine  Hyperkyphosis.    Tenderness     Left Shoulder   Tenderness in the supraspinatus tendon. No tenderness in the bicipital groove.     Active Range of Motion   Left Shoulder   Flexion: 155 degrees   External rotation 90°: 85 degrees   Internal rotation 90°: 50 degrees     Right Shoulder   Flexion: 165 degrees   External rotation 90°: 90 degrees  Internal rotation 90°: 60 degrees     Strength/Myotome Testing     Left Shoulder     Planes of Motion   Flexion: 4   Extension: 4   Abduction: 4-   Adduction: 4+   External rotation at 0°: 4-   Internal rotation at 0°: 4+     Isolated Muscles   Supraspinatus: 3+     Tests     Left Shoulder   Positive anterior slide , Hawkin's and anterior slide .   Negative drop arm, empty can, lift-off and Speed's.                  Precautions: standard  POC expires Unit limit Auth Expiration date PT/OT + Visit Limit?   25                              Visit/Unit Tracking  AUTH Status:  Date 5/7 5/9 5/14 5/16 5/27 5/30 6/3 6/9        Used 1 2 3 4 5 6 7 8        Remaining                  FOTO                      Manuals 5/7 5/9 5/14 5/16 5/27 5/30 6/3 6/9                  MT left shoulder 10 min 10'  15 min 15 min 15 min 8' 8' 15 min                               Neuro Re-Ed             Scap wall walks      YTB 7x YTB 7x      Scap flutter to 90       YTB 5x  YTB x                                                                       Ther Ex             UBE 4 min 5'  5 min 5 min  5 min 5' 5' 5 min     pulleys 30x 30x  30x 30x 30x 30x  30x  30x     Cane FF 30x 30x 30x 20x 2#  30x 2# 30x 2# 30x 2/30     Supine stability circles cw/ccw  30x ea 30x 30x 2#  30x 2# 30x  2# 30x  2/30     Supine serratus press  30x  30x 2# 30x 30x 2#  2# 30x  2/30     BTB rows 30x 30x 30x 30x 30x Black 30x Black 30x 30x     BTB ext  30x  30x 30x 30x Black 30x Black  30x     GTB ER 30x 30x 30x 30x 30x 30x 30x 30x     GTB IR 30x 30x  30x 30x 30x 30x  30x  30x     Prone IYT   2x10  2x10 2x10 30x 30x   30x     Landmine press (cane on half wall)  2# 3x10  2/30 2#/30 2/30 2# 3x10  2# 3x10  2/30     Standing abd and scaption    2# ff 20x 0# 10x scap 2/20 2# 2x10 ea  2# 2x10 ea  2/30     No monies        NV  20x                               Ther Activity             Pegs red    2x 2x 2x 2x 2x                  Gait Training                                       Modalities                                                        Manuals                                                                 Neuro Re-Ed                                                                                                        Ther Ex                                                                                                                     Ther Activity                                       Gait Training                                       Modalities

## 2025-06-09 NOTE — PROGRESS NOTES
Adult Annual Physical  Name: Danis Forde      : 1970      MRN: [id number]  Encounter Provider: Isabelle Carey DNP  Encounter Date: 2025   Encounter department: [facility]    :  Assessment & Plan  Encounter for well adult exam without abnormal findings    Orders:    CBC and differential; Future    Comprehensive metabolic panel; Future    Lipid Panel with Direct LDL reflex; Future            Immunizations:  - Immunizations due: Prevnar 20, Tdap and Zoster (Shingrix)         History of Present Illness     Adult Annual Physical:  Patient presents for annual physical.     Diet and Physical Activity:  - Diet/Nutrition: well balanced diet. likes salty snacks  - Exercise: walking.    Depression Screening:  - PHQ-2 Score: 0    General Health:  - Sleep: sleeps well and 7-8 hours of sleep on average.  - Hearing: normal hearing right ear, normal hearing left ear and tinnitus.  - Vision: wears glasses and most recent eye exam < 1 year ago.  - Dental: regular dental visits.     Health:  - History of STDs: no.   - Urinary symptoms: none.     Advanced Care Planning:  - Has an advanced directive?: yes    - Has a durable medical POA?: yes    - ACP document given to patient?: no      Review of Systems   Constitutional:  Negative for activity change, chills, fatigue and fever.   HENT:  Negative for congestion, ear discharge, ear pain, sinus pressure, sinus pain, sore throat, tinnitus and trouble swallowing.    Eyes:  Negative for photophobia, pain, discharge, itching and visual disturbance.   Respiratory:  Negative for cough, chest tightness, shortness of breath and wheezing.    Cardiovascular:  Negative for chest pain and leg swelling.   Gastrointestinal:  Negative for abdominal distention, abdominal pain, constipation, diarrhea, nausea and vomiting.   Endocrine: Negative for polydipsia, polyphagia and polyuria.   Genitourinary:  Negative for dysuria and frequency.   Musculoskeletal:   Negative for arthralgias, myalgias, neck pain and neck stiffness.   Skin:  Negative for color change.   Neurological:  Negative for dizziness, syncope, weakness, numbness and headaches.   Hematological:  Does not bruise/bleed easily.   Psychiatric/Behavioral:  Negative for behavioral problems, confusion, self-injury, sleep disturbance and suicidal ideas. The patient is not nervous/anxious.          Objective   /82 (BP Location: Left arm, Patient Position: Sitting, Cuff Size: Standard)   Pulse 74   Temp 98.2 °F (36.8 °C) (Tympanic)   Resp 20   Ht 6' (1.829 m)   Wt 106 kg (233 lb)   SpO2 99%   BMI 31.60 kg/m²     Physical Exam  Vitals and nursing note reviewed.   Constitutional:       Appearance: Normal appearance.   HENT:      Head: Normocephalic and atraumatic.     Cardiovascular:      Rate and Rhythm: Normal rate and regular rhythm.      Pulses: Normal pulses.      Heart sounds: Normal heart sounds.   Pulmonary:      Effort: Pulmonary effort is normal.      Breath sounds: Normal breath sounds.     Musculoskeletal:         General: Normal range of motion.     Skin:     General: Skin is warm.     Neurological:      Mental Status: He is alert.

## 2025-06-10 ENCOUNTER — OFFICE VISIT (OUTPATIENT)
Dept: OBGYN CLINIC | Facility: CLINIC | Age: 55
End: 2025-06-10
Payer: COMMERCIAL

## 2025-06-10 VITALS
HEART RATE: 79 BPM | BODY MASS INDEX: 32.45 KG/M2 | TEMPERATURE: 98.2 F | OXYGEN SATURATION: 96 % | WEIGHT: 239.6 LBS | HEIGHT: 72 IN

## 2025-06-10 DIAGNOSIS — S46.012A ROTATOR CUFF STRAIN, LEFT, INITIAL ENCOUNTER: ICD-10-CM

## 2025-06-10 DIAGNOSIS — M25.512 ACUTE PAIN OF LEFT SHOULDER: Primary | ICD-10-CM

## 2025-06-10 PROCEDURE — 99213 OFFICE O/P EST LOW 20 MIN: CPT | Performed by: FAMILY MEDICINE

## 2025-06-10 NOTE — PROGRESS NOTES
[facility] [address] [place]  [facility] [id number] [phone]      Assessment:  1. Acute pain of left shoulder  2. Rotator cuff strain, left, initial encounter    Assessment & Plan  Acute pain of left shoulder         Rotator cuff strain, left, initial encounter           Patient Instructions   F/u 4 wks  Finish therapy/home exercises  Icing/heat/OTC pain meds as needed.    Plan:  Patient Instructions   F/u 4 wks  Finish therapy/home exercises  Icing/heat/OTC pain meds as needed.     Return in about 4 weeks (around 7/8/2025) for Recheck.    Chief Complaint:  Chief Complaint   Patient presents with    Left Shoulder - Follow-up       Subjective:   HPI    Patient ID: Danis Forde is a 54 y.o. male        Here c/o L shoulder pain  Getting better- less pain, better ROM  Pain reaching up  Going to PT  Sharp pain  Motrin- Prn- helps a little    Review of Systems   Constitutional:  Negative for fatigue and fever.   Respiratory:  Negative for shortness of breath.    Cardiovascular:  Negative for chest pain.   Gastrointestinal:  Negative for abdominal pain and nausea.   Genitourinary:  Negative for dysuria.   Musculoskeletal:  Positive for arthralgias.   Skin:  Negative for rash and wound.   Neurological:  Negative for weakness and headaches.       Objective:  Vitals:  Pulse 79   Temp 98.2 °F (36.8 °C) (Tympanic)   Ht 6' (1.829 m)   Wt 109 kg (239 lb 9.6 oz)   SpO2 96%   BMI 32.50 kg/m²     The following portions of the patient's history were reviewed and updated as appropriate: allergies, current medications, past family history, past medical history, past social history, past surgical history, and problem list.    Physical exam:  Physical Exam  Constitutional:       Appearance: Normal appearance. He is normal weight.     Eyes:      Extraocular Movements: Extraocular movements intact.     Pulmonary:      Effort: Pulmonary effort is normal.     Musculoskeletal:       Cervical back: Normal range of motion.     Skin:     General: Skin is warm and dry.     Neurological:      General: No focal deficit present.      Mental Status: He is alert and oriented to person, place, and time. Mental status is at baseline.     Psychiatric:         Mood and Affect: Mood normal.         Behavior: Behavior normal.         Thought Content: Thought content normal.         Judgment: Judgment normal.       Left Shoulder Exam     Tenderness   The patient is experiencing no tenderness.     Range of Motion   The patient has normal left shoulder ROM.    Muscle Strength   The patient has normal left shoulder strength.    Tests   Chi test: positive     Comments:  Mild pos yerg/speeds  Pos empty can/push off test                  Troy Cueto MD

## 2025-06-13 ENCOUNTER — OFFICE VISIT (OUTPATIENT)
Dept: PHYSICAL THERAPY | Age: 55
End: 2025-06-13
Attending: FAMILY MEDICINE
Payer: COMMERCIAL

## 2025-06-13 DIAGNOSIS — S46.012A ROTATOR CUFF STRAIN, LEFT, INITIAL ENCOUNTER: Primary | ICD-10-CM

## 2025-06-13 PROCEDURE — 97140 MANUAL THERAPY 1/> REGIONS: CPT

## 2025-06-13 PROCEDURE — 97110 THERAPEUTIC EXERCISES: CPT

## 2025-06-13 NOTE — PROGRESS NOTES
Daily Note     Today's date: 2025  Patient name: Danis Forde  : 1970  MRN: [id number]  Referring provider: Troy Cueto MD  Dx:   Encounter Diagnosis     ICD-10-CM    1. Rotator cuff strain, left, initial encounter  S46.012A           Start Time: 930  Stop Time: 1015  Total time in clinic (min): 45 minutes    Subjective: Reports feeling improvements overall. Reduced pain and feels improvements in strength. States he saw his MD yesterday and he is happy with the progress, is to continue PT for a few more weeks and f/u with MD in 4 weeks.       Objective: See treatment diary below      Assessment: Fatigues easily with scap strengthening activities, but was able to increase repetitions today for scap tband wall walks and flutters. Good recall of his program with some cues for carryover and proper performance. Patient would benefit from continued PT      Plan: Continue per plan of care.      Precautions: standard  POC expires Unit limit Auth Expiration date PT/OT + Visit Limit?   25                              Visit/Unit Tracking  AUTH Status:  Date 5/7 5/9 5/14 5/16 5/27 5/30 6/3 6/9 6/13       Used 1 2 3 4 5 6 7 8 9       Remaining                 FOTO                      Manuals 5/7 5/9  5/14 5/16 5/27 5/30 6/3 6/9 6/13                 MT left shoulder 10 min 10'  15 min 15 min 15 min 8' 8' 15 min 10'                              Neuro Re-Ed             Scap wall walks      YTB 7x YTB 7x  YTB 10x    Scap flutter to 90       YTB 5x  YTB x  YTB 10x                                                                      Ther Ex             UBE 4 min 5'  5 min 5 min  5 min 5' 5' 5 min 5 min    pulleys 30x 30x  30x 30x 30x 30x  30x  30x 30x     Cane FF 30x 30x 30x 20x 2#  30x 2# 30x 2# 30x 2/30 2# 30x    Supine stability circles cw/ccw  30x ea 30x 30x 2#  30x 2# 30x  2# 30x  2/30 2# 30x    Supine serratus press  30x  30x 2# 30x 30x 2#  2# 30x  2/30 2# 30x    BTB rows 30x 30x 30x 30x 30x Black 30x  Black 30x 30x 30x    BTB ext  30x  30x 30x 30x Black 30x Black  30x 30x     GTB ER 30x 30x 30x 30x 30x 30x 30x 30x 30x    GTB IR 30x 30x  30x 30x 30x 30x  30x  30x 30x    Prone IYT   2x10  2x10 2x10 30x 30x   30x 30x ea    Landmine press (cane on half wall)  2# 3x10  2/30 2#/30 2/30 2# 3x10  2# 3x10  2/30 3# 30x     Standing abd and scaption    2# ff 20x 0# 10x scap 2/20 2# 2x10 ea  2# 2x10 ea  2/30 2# 30x ea    No monies        NV  20x RTB 20x                               Ther Activity             Pegs red    2x 2x 2x 2x 2x 2x                 Gait Training                                       Modalities                                                        Manuals                                                                 Neuro Re-Ed                                                                                                        Ther Ex                                                                                                                     Ther Activity                                       Gait Training                                       Modalities

## 2025-06-17 ENCOUNTER — OFFICE VISIT (OUTPATIENT)
Dept: PHYSICAL THERAPY | Age: 55
End: 2025-06-17
Attending: FAMILY MEDICINE
Payer: COMMERCIAL

## 2025-06-17 DIAGNOSIS — S46.012A ROTATOR CUFF STRAIN, LEFT, INITIAL ENCOUNTER: Primary | ICD-10-CM

## 2025-06-17 PROCEDURE — 97140 MANUAL THERAPY 1/> REGIONS: CPT | Performed by: PHYSICAL THERAPIST

## 2025-06-17 PROCEDURE — 97110 THERAPEUTIC EXERCISES: CPT | Performed by: PHYSICAL THERAPIST

## 2025-06-17 NOTE — PROGRESS NOTES
Daily Note     Today's date: 2025  Patient name: Danis Forde  : 1970  MRN: [id number]  Referring provider: Troy Cueto MD  Dx:   Encounter Diagnosis     ICD-10-CM    1. Rotator cuff strain, left, initial encounter  S46.012A           Start Time: 1345  Stop Time: 1430  Total time in clinic (min): 45 minutes    Subjective: patient reports left shoulder pain with yawning      Objective: See treatment diary below      Assessment: Tolerated treatment fair. Patient demonstrated fatigue post treatment, exhibited good technique with therapeutic exercises, and would benefit from continued PT, increased ROM and strength noted to left but patient still demonstrates a tight posterior capsule with an increased anterior glide       Plan: Progress treatment as tolerated.       Precautions: standard  POC expires Unit limit Auth Expiration date PT/OT + Visit Limit?   25                              Visit/Unit Tracking  AUTH Status:  Date 5/7 5/9 5/14 5/16 5/27 5/30 6/3 6/9 6/13 6/17      Used 1 2 3 4 5 6 7 8 9       Remaining                 FOTO                      Manuals 5/7 5/9  5/14 5/16 5/27 5/30 6/3 6/9 6/13 6/17                MT left shoulder 10 min 10'  15 min 15 min 15 min 8' 8' 15 min 10' 10                             Neuro Re-Ed             Scap wall walks      YTB 7x YTB 7x  YTB 10x 10x   Scap flutter to 90       YTB 5x  YTB x  YTB 10x  10x                                                                    Ther Ex             UBE 4 min 5'  5 min 5 min  5 min 5' 5' 5 min 5 min 5 min   pulleys 30x 30x  30x 30x 30x 30x  30x  30x 30x  30x   Cane FF 30x 30x 30x 20x 2#  30x 2# 30x 2# 30x 2/30 2# 30x 2/30   Supine stability circles cw/ccw  30x ea 30x 30x 2#  30x 2# 30x  2# 30x  2/30 2# 30x 2/30   Supine serratus press  30x  30x 2# 30x 30x 2#  2# 30x  2/30 2# 30x 2/30   BTB rows 30x 30x 30x 30x 30x Black 30x Black 30x 30x 30x 30x   BTB ext  30x  30x 30x 30x Black 30x Black  30x 30x  30x   GTB ER 30x 30x  30x 30x 30x 30x 30x 30x 30x 30x   GTB IR 30x 30x  30x 30x 30x 30x  30x  30x 30x 30x   Prone IYT   2x10  2x10 2x10 30x 30x   30x 30x ea 30x   Landmine press (cane on half wall)  2# 3x10  2/30 2#/30 2/30 2# 3x10  2# 3x10  2/30 3# 30x  3/30   Standing abd and scaption    2# ff 20x 0# 10x scap 2/20 2# 2x10 ea  2# 2x10 ea  2/30 2# 30x ea 2/30   No monies        NV  20x RTB 20x  20x                             Ther Activity             Pegs red    2x 2x 2x 2x 2x 2x                 Gait Training                                       Modalities                                                        Manuals                                                                 Neuro Re-Ed                                                                                                        Ther Ex                                                                                                                     Ther Activity                                       Gait Training                                       Modalities

## 2025-06-20 ENCOUNTER — OFFICE VISIT (OUTPATIENT)
Dept: PHYSICAL THERAPY | Age: 55
End: 2025-06-20
Attending: FAMILY MEDICINE
Payer: COMMERCIAL

## 2025-06-20 DIAGNOSIS — S46.012A ROTATOR CUFF STRAIN, LEFT, INITIAL ENCOUNTER: Primary | ICD-10-CM

## 2025-06-20 PROCEDURE — 97110 THERAPEUTIC EXERCISES: CPT

## 2025-06-20 PROCEDURE — 97140 MANUAL THERAPY 1/> REGIONS: CPT

## 2025-06-20 NOTE — PROGRESS NOTES
Daily Note     Today's date: 2025  Patient name: Danis Forde  : 1970  MRN: [id number]  Referring provider: Troy Cueto MD  Dx:   Encounter Diagnosis     ICD-10-CM    1. Rotator cuff strain, left, initial encounter  S46.012A           Start Time: 1000  Stop Time: 1045  Total time in clinic (min): 45 minutes    Subjective: Reports moving bags of pellets yesterday, some muscle soreness reported. States he has the most discomfort with movements involving abduction in an IR position. States it is better overall.       Objective: See treatment diary below  Patient was seen 1:1 for 30 min.     Assessment: Did not progress today due to increased soreness upon arrival. Will progress next visit if appropriate. ROM progressing well with minimal tightness at end range. Patient would benefit from continued PT      Plan: Continue per plan of care.      Precautions: standard  POC expires Unit limit Auth Expiration date PT/OT + Visit Limit?   25                              Visit/Unit Tracking  AUTH Status:  Date 5/7 5/9 5/14 5/16 5/27 5/30 6/3 6/9 6/13 6/17 6/20     Used 1 2 3 4 5 6 7 8 9 10 11      Remaining                 FOTO                      Manuals 6/20  5/14 5/16 5/27 5/30 6/3 6/9 6/13 6/17                MT left shoulder 10'   15 min 15 min 15 min 8' 8' 15 min 10' 10                             Neuro Re-Ed             Scap wall walks YTB 10x     YTB 7x YTB 7x  YTB 10x 10x   Scap flutter to 90  YTB 10x      YTB 5x  YTB x  YTB 10x  10x                                                                    Ther Ex             UBE 5 min   5 min 5 min  5 min 5' 5' 5 min 5 min 5 min   pulleys 30x  30x 30x 30x 30x  30x  30x 30x  30x   Cane FF 2# 30x   30x 20x 2#  30x 2# 30x 2# 30x 2/30 2# 30x 2/30   Supine stability circles cw/ccw 2# 30x  30x 30x 2#  30x 2# 30x  2# 30x  2/30 2# 30x 2/30   Supine serratus press 2# 30x   30x 2# 30x 30x 2#  2# 30x  2/30 2# 30x 2/30   banded rows Black 30x  BTB 30x 30x 30x Black  30x Black 30x 30x 30x 30x   banded ext Black 30x   BTB 30x 30x 30x Black 30x Black  30x 30x  30x   GTB ER 30x  30x 30x 30x 30x 30x 30x 30x 30x   GTB IR 30x   30x 30x 30x 30x  30x  30x 30x 30x   Prone IYT  30x ea  2x10 2x10 30x 30x   30x 30x ea 30x   Landmine press (cane on half wall) 3# 30x   2/30 2#/30 2/30 2# 3x10  2# 3x10  2/30 3# 30x  3/30   Standing abd and scaption 2# 30x    2# ff 20x 0# 10x scap 2/20 2# 2x10 ea  2# 2x10 ea  2/30 2# 30x ea 2/30   No monies  RTB 20x       NV  20x RTB 20x  20x   Face pulls  NV            OH press  NV                                                    Ther Activity             Pegs red 2x   2x 2x 2x 2x 2x 2x                 Gait Training                                       Modalities                                                        Manuals                                                                 Neuro Re-Ed                                                                                                        Ther Ex                                                                                                                     Ther Activity                                       Gait Training                                       Modalities

## 2025-06-23 ENCOUNTER — OFFICE VISIT (OUTPATIENT)
Dept: PHYSICAL THERAPY | Age: 55
End: 2025-06-23
Attending: FAMILY MEDICINE
Payer: COMMERCIAL

## 2025-06-23 DIAGNOSIS — S46.012A ROTATOR CUFF STRAIN, LEFT, INITIAL ENCOUNTER: Primary | ICD-10-CM

## 2025-06-23 PROCEDURE — 97110 THERAPEUTIC EXERCISES: CPT | Performed by: PHYSICAL MEDICINE & REHABILITATION

## 2025-06-23 PROCEDURE — 97140 MANUAL THERAPY 1/> REGIONS: CPT | Performed by: PHYSICAL MEDICINE & REHABILITATION

## 2025-06-23 PROCEDURE — 97112 NEUROMUSCULAR REEDUCATION: CPT | Performed by: PHYSICAL MEDICINE & REHABILITATION

## 2025-06-23 NOTE — PROGRESS NOTES
Daily Note     Today's date: 2025  Patient name: Danis Forde  : 1970  MRN: [id number]  Referring provider: Troy Cueto MD  Dx:   Encounter Diagnosis     ICD-10-CM    1. Rotator cuff strain, left, initial encounter  S46.012A                  Subjective: Patient reports overall improvement in sxs since onset, feels he has reached plateau.     Objective: See treatment diary below    Assessment: Tolerated treatment well. Challenged with weight progressions as charted. Patient demonstrated fatigue post treatment, exhibited good technique with therapeutic exercises, and would benefit from continued PT.    Plan: Continue per plan of care.      Precautions: standard  POC expires Unit limit Auth Expiration date PT/OT + Visit Limit?   25                              Visit/Unit Tracking  AUTH Status:  Date 5/7 5/9 5/14 5/16 5/27 5/30 6/3 6/9 6/13 6/17 6/20 6/23    Used 1 2 3 4 5 6 7 8 9 10 11  12    Remaining                 FOTO                    Manuals 6/20 6/23  5/16 5/27 5/30 6/3 6/9 6/13 6/17                MT left shoulder 10'  LH, PROM, STM lateral humerus  15 min 15 min 8' 8' 15 min 10' 10                             Neuro Re-Ed             Scap wall walks YTB 10x RTB 5x    YTB 7x YTB 7x  YTB 10x 10x   Scap flutter to 90  YTB 10x  RTB 5x    YTB 5x  YTB x  YTB 10x  10x                                                                    Ther Ex             UBE 5 min  5'  5 min  5 min 5' 5' 5 min 5 min 5 min   pulleys 30x   30x 30x 30x  30x  30x 30x  30x   Cane FF 2# 30x  3# 30x  20x 2#  30x 2# 30x 2# 30x 2/30 2# 30x 2/30   Supine stability circles cw/ccw 2# 30x 3# 30x  30x 2#  30x 2# 30x  2# 30x  2/30 2# 30x 2/30   Supine serratus press 2# 30x  3# 30x  2# 30x 30x 2#  2# 30x  2/30 2# 30x 2/30   banded rows Black 30x [place] 10# 3x10  30x 30x Black 30x Black 30x 30x 30x 30x   banded ext Black 30x  [place] 8# 3x10  30x 30x Black 30x Black  30x 30x  30x   GTB ER 30x [place]  30x 30x 30x 30x 30x  30x 30x   GTB IR 30x  [place] 5# 2x10  30x 30x 30x  30x  30x 30x 30x   Prone IYT  30x ea   2x10 30x 30x   30x 30x ea 30x   Landmine press (cane on half wall) 3# 30x    2#/30 2/30 2# 3x10  2# 3x10  2/30 3# 30x  3/30   Standing abd and scaption 2# 30x  3# 2x10 scap 10x abd  2# ff 20x 0# 10x scap 2/20 2# 2x10 ea  2# 2x10 ea  2/30 2# 30x ea 2/30   No monies  RTB 20x  RTB 10x, 8x     NV  20x RTB 20x  20x   Face pulls  NV            OH press  NV                                                    Ther Activity             Pegs red 2x   2x 2x 2x 2x 2x 2x                 Gait Training                                       Modalities                                                        Manuals                                                                 Neuro Re-Ed                                                                                                        Ther Ex                                                                                                                     Ther Activity                                       Gait Training                                       Modalities

## 2025-06-27 ENCOUNTER — OFFICE VISIT (OUTPATIENT)
Dept: PHYSICAL THERAPY | Age: 55
End: 2025-06-27
Attending: FAMILY MEDICINE
Payer: COMMERCIAL

## 2025-06-27 DIAGNOSIS — S46.012A ROTATOR CUFF STRAIN, LEFT, INITIAL ENCOUNTER: Primary | ICD-10-CM

## 2025-06-27 PROCEDURE — 97110 THERAPEUTIC EXERCISES: CPT

## 2025-06-27 PROCEDURE — 97140 MANUAL THERAPY 1/> REGIONS: CPT

## 2025-06-27 NOTE — PROGRESS NOTES
Daily Note     Today's date: 2025  Patient name: Danis Forde  : 1970  MRN: [id number]  Referring provider: Troy Cueto MD  Dx:   Encounter Diagnosis     ICD-10-CM    1. Rotator cuff strain, left, initial encounter  S46.012A           Start Time: 1510  Stop Time: 1600  Total time in clinic (min): 50 minutes    Subjective: Reports feeling improvement since increasing resistance last visit.       Objective: See treatment diary below  Patient was seen 1:1 for 25 min.     Assessment: Attempted face pulls but patient c/o mild discomfort, modified and performed a high row and will progress as able. Good recall of program with few cues for carryover of program.  Will continue to progress as able. Patient would benefit from continued PT.      Plan: Continue per plan of care.      Precautions: standard  POC expires Unit limit Auth Expiration date PT/OT + Visit Limit?   25                              Visit/Unit Tracking  AUTH Status:  Date 5/7 5/9 5/14 5/16 5/27 5/30 6/3 6/9 6/13 6/17 6/20 6/23    Used 1 2 3 4 5 6 7 8 9 10 11  12    Remaining                 FOTO                 Visit/Unit Tracking  AUTH Status:  Date                Used 13               Remaining                 FOTO                       Manuals 6/20 6/23 6/27  5/27 5/30 6/3 6/9 6/13 6/17                MT left shoulder 10'  LH, PROM, STM lateral humerus JR PROM  15 min 8' 8' 15 min 10' 10                             Neuro Re-Ed             Scap wall walks YTB 10x RTB 5x RTB 10x   YTB 7x YTB 7x  YTB 10x 10x   Scap flutter to 90  YTB 10x  RTB 5x RTB 10x   YTB 5x  YTB x  YTB 10x  10x                                                                    Ther Ex             UBE 5 min  5' 5'   5 min 5' 5' 5 min 5 min 5 min   pulleys 30x    30x 30x  30x  30x 30x  30x   Cane FF 2# 30x  3# 30x 3# 30x  30x 2# 30x 2# 30x 2/30 2# 30x 2/30   Supine stability circles cw/ccw 2# 30x 3# 30x 3# 30x  30x 2# 30x  2# 30x  2/30 2# 30x 2/30    Supine serratus press 2# 30x  3# 30x 3# 30x  30x 2#  2# 30x  2/30 2# 30x 2/30   banded rows Black 30x [place] 10# 3x10 20# 3x10   30x Black 30x Black 30x 30x 30x 30x   banded ext Black 30x  [place] 8# 3x10 15# 3x10   30x Black 30x Black  30x 30x  30x   GTB ER 30x [place] Pamela 5# 2x10   30x 30x 30x 30x 30x 30x   GTB IR 30x  [place] 5# 2x10 10# 2x10   30x 30x  30x  30x 30x 30x   Prone IYT  30x ea    30x 30x   30x 30x ea 30x   Landmine press (cane on half wall) 3# 30x     2/30 2# 3x10  2# 3x10  2/30 3# 30x  3/30   Standing abd and scaption 2# 30x  3# 2x10 scap 10x abd 3# 10x FF, scap, abd   2/20 2# 2x10 ea  2# 2x10 ea  2/30 2# 30x ea 2/30   No monies  RTB 20x  RTB 10x, 8x RTB 3x10    NV  20x RTB 20x  20x   High row NV  BTB 2x10           OH press  NV   NV                                                  Ther Activity             Pegs red 2x  2# 2x  2x 2x 2x 2x 2x                 Gait Training                                       Modalities                                                        Manuals                                                                 Neuro Re-Ed                                                                                                        Ther Ex                                                                                                                     Ther Activity                                       Gait Training                                       Modalities

## 2025-06-30 ENCOUNTER — OFFICE VISIT (OUTPATIENT)
Dept: PHYSICAL THERAPY | Age: 55
End: 2025-06-30
Attending: FAMILY MEDICINE
Payer: COMMERCIAL

## 2025-06-30 DIAGNOSIS — S46.012A ROTATOR CUFF STRAIN, LEFT, INITIAL ENCOUNTER: Primary | ICD-10-CM

## 2025-06-30 PROCEDURE — 97110 THERAPEUTIC EXERCISES: CPT

## 2025-06-30 NOTE — PROGRESS NOTES
Daily Note     Today's date: 2025  Patient name: Danis Forde  : 1970  MRN: [id number]  Referring provider: Troy Cueto MD  Dx:   Encounter Diagnosis     ICD-10-CM    1. Rotator cuff strain, left, initial encounter  S46.012A           Start Time: 915  Stop Time: 1000  Total time in clinic (min): 45 minutes    Subjective: Pt was putting up a shelf this past weekend which created shoulder pain. Otherwise he has been stretching at home. He has noticed slow improvement of ROM with PT. He is still unsure if this is a RTC strain or something else.       Objective: See treatment diary below      Assessment: Added OH press in sitting as indicated by the treatment diary, he tolerated this well. Continued current POC only by increasing resistance or repetitions as outlined below. Tolerated treatment well. Patient demonstrated fatigue post treatment and he demonstrated independence of HEP.       Plan: Continue per plan of care.      Precautions: standard  POC expires Unit limit Auth Expiration date PT/OT + Visit Limit?   25                              Visit/Unit Tracking  AUTH Status:  Date 5/7 5/9 5/14 5/16 5/27 5/30 6/3 6/9 6/13 6/17 6/20 6/23    Used 1 2 3 4 5 6 7 8 9 10 11  12    Remaining                 FOTO                 Visit/Unit Tracking  AUTH Status:  Date 6/27 6/30              Used 13 14              Remaining                 FOTO                       Manuals 6/20 6/23 6/27 6/30  5/30 6/3 6/9 6/13 6/17                MT left shoulder 10'  LH, PROM, STM lateral humerus JR PROM JM PROM   8' 8' 15 min 10' 10                             Neuro Re-Ed             Scap wall walks YTB 10x RTB 5x RTB 10x RTB 10x  YTB 7x YTB 7x  YTB 10x 10x   Scap flutter to 90  YTB 10x  RTB 5x RTB 10x RTB 10x  YTB 5x  YTB x  YTB 10x  10x                                                                    Ther Ex             UBE 5 min  5' 5'  5'  5' 5' 5 min 5 min 5 min   pulleys 30x     30x  30x  30x 30x   30x   Cane FF 2# 30x  3# 30x 3# 30x 3# 30x   2# 30x 2# 30x 2/30 2# 30x 2/30   Supine stability circles cw/ccw 2# 30x 3# 30x 3# 30x 3# 30x   2# 30x  2# 30x  2/30 2# 30x 2/30   Supine serratus press 2# 30x  3# 30x 3# 30x 3# 30x   2#  2# 30x  2/30 2# 30x 2/30   banded rows Black 30x Pam[place] 10# 3x10 20# 3x10  20# 3x10   Black 30x Black 30x 30x 30x 30x   banded ext Black 30x  [place] 8# 3x10 15# 3x10  15# 3x10   Black 30x Black  30x 30x  30x   GTB ER 30x [place]ml[place] 5# 2x10  Pamela 5# 2x10   30x 30x 30x 30x 30x   GTB IR 30x  [place] 5# 2x10 10# 2x10  10# 2x10   30x  30x  30x 30x 30x   Prone IYT  30x ea     30x   30x 30x ea 30x   Landmine press (cane on half wall) 3# 30x      2# 3x10  2# 3x10  2/30 3# 30x  3/30   Standing abd and scaption 2# 30x  3# 2x10 scap 10x abd 3# 10x FF, scap, abd  3# 10x FF, scap, abd   2# 2x10 ea  2# 2x10 ea  2/30 2# 30x ea 2/30   No monies  RTB 20x  RTB 10x, 8x RTB 3x10 Rtb x30   NV  20x RTB 20x  20x   High row NV  BTB 2x10  BTB x30          OH press  NV   NV  Cane #3 2x10                                                 Ther Activity             Pegs red 2x  2# 2x 2# 2x   2x 2x 2x 2x                 Gait Training                                       Modalities                                                        Manuals                                                                 Neuro Re-Ed                                                                                                        Ther Ex                                                                                                                     Ther Activity                                       Gait Training                                       Modalities

## 2025-07-03 ENCOUNTER — OFFICE VISIT (OUTPATIENT)
Dept: PHYSICAL THERAPY | Age: 55
End: 2025-07-03
Attending: FAMILY MEDICINE
Payer: COMMERCIAL

## 2025-07-03 DIAGNOSIS — S46.012A ROTATOR CUFF STRAIN, LEFT, INITIAL ENCOUNTER: Primary | ICD-10-CM

## 2025-07-03 PROCEDURE — 97110 THERAPEUTIC EXERCISES: CPT

## 2025-07-03 PROCEDURE — 97140 MANUAL THERAPY 1/> REGIONS: CPT

## 2025-07-03 NOTE — PROGRESS NOTES
"Daily Note     Today's date: 7/3/2025  Patient name: Danis Forde  : 1970  MRN: [id number]  Referring provider: Troy Cueto MD  Dx:   Encounter Diagnosis     ICD-10-CM    1. Rotator cuff strain, left, initial encounter  S46.012A           Start Time: 1345  Stop Time: 1425  Total time in clinic (min): 40 minutes    Subjective: Reports a \"rolling\" sensation at the posterior shoulder when he was performing pulleys at home. States he did a few reps in the morning but stopped due to discomfort. Going to his MD on Tuesday next week and will discuss plan       Objective: See treatment diary below      Assessment:  Good recall of his program with few cues for carryover. Cues to slow down eccentric movements, especially during resisted rows and extensions. Patient has a f/u with his MD next week and will call with his plans for PT post appt.       Plan: Continue per plan of care.      Precautions: standard  POC expires Unit limit Auth Expiration date PT/OT + Visit Limit?   25                              Visit/Unit Tracking  AUTH Status:  Date 5/7 5/9 5/14 5/16 5/27 5/30 6/3 6/9 6/13 6/17 6/20 6/23    Used 1 2 3 4 5 6 7 8 9 10 11  12    Remaining                 FOTO                 Visit/Unit Tracking  AUTH Status:  Date 6/27 6/30 7/3             Used 13 14 15             Remaining                 FOTO                       Manuals 6/20 6/23 6/27 6/30 7/3  6/3 6/9 6/13 6/17                MT left shoulder 10'  LH, PROM, STM lateral humerus JR PROM JM PROM  JR PROM  8' 15 min 10' 10                             Neuro Re-Ed             Scap wall walks YTB 10x RTB 5x RTB 10x RTB 10x RTB 10x  YTB 7x  YTB 10x 10x   Scap flutter to 90  YTB 10x  RTB 5x RTB 10x RTB 10x RTB 10x  YTB x  YTB 10x  10x                                                                    Ther Ex             UBE 5 min  5' 5'  5' 5'   5' 5 min 5 min 5 min   pulleys 30x      30x  30x 30x  30x   Cane FF 2# 30x  3# 30x 3# 30x 3# 30x  3# " 30x   2# 30x 2/30 2# 30x 2/30   Supine stability circles cw/ccw 2# 30x 3# 30x 3# 30x 3# 30x  3# 30x   2# 30x  2/30 2# 30x 2/30   Supine serratus press 2# 30x  3# 30x 3# 30x 3# 30x  3# 30x   2# 30x  2/30 2# 30x 2/30   banded rows Black 30x [place] 10# 3x10 20# 3x10  20# 3x10  20# 3x10   Black 30x 30x 30x 30x   banded ext Black 30x  Pamela 8# 3x10 15# 3x10  15# 3x10  15# 3x10   Black  30x 30x  30x   GTB ER 30x Pamela Pamela 5# 2x10  [place] 5# 2x10  Pamela 5# 2x10   30x 30x 30x 30x   GTB IR 30x  [place] 5# 2x10 10# 2x10  10# 2x10  10# 2x10   30x  30x 30x 30x   Prone IYT  30x ea       30x 30x ea 30x   Landmine press (cane on half wall) 3# 30x       2# 3x10  2/30 3# 30x  3/30   Standing abd and scaption 2# 30x  3# 2x10 scap 10x abd 3# 10x FF, scap, abd  3# 10x FF, scap, abd  3# 10x FF, scap, abd   2# 2x10 ea  2/30 2# 30x ea 2/30   No monies  RTB 20x  RTB 10x, 8x RTB 3x10 Rtb x30 RTB 30x  NV  20x RTB 20x  20x   High row NV  BTB 2x10  BTB x30  BTB 30x         OH press  NV   NV  Cane #3 2x10  Cane 3# 2x10         Ball on wall      NV                                   Ther Activity             Pegs red 2x  2# 2x 2# 2x  2# 2x  2x 2x 2x                 Gait Training                                       Modalities                                                        Manuals                                                                 Neuro Re-Ed                                                                                                        Ther Ex                                                                                                                     Ther Activity                                       Gait Training                                       Modalities

## 2025-07-08 ENCOUNTER — OFFICE VISIT (OUTPATIENT)
Dept: OBGYN CLINIC | Facility: CLINIC | Age: 55
End: 2025-07-08
Payer: COMMERCIAL

## 2025-07-08 VITALS
HEIGHT: 72 IN | WEIGHT: 234.6 LBS | OXYGEN SATURATION: 97 % | BODY MASS INDEX: 31.77 KG/M2 | TEMPERATURE: 97.9 F | HEART RATE: 84 BPM

## 2025-07-08 DIAGNOSIS — S46.012A ROTATOR CUFF STRAIN, LEFT, INITIAL ENCOUNTER: ICD-10-CM

## 2025-07-08 DIAGNOSIS — M16.11 PRIMARY OSTEOARTHRITIS OF RIGHT HIP: ICD-10-CM

## 2025-07-08 DIAGNOSIS — M75.82 PECTORALIS MAJOR TENDONITIS, LEFT: ICD-10-CM

## 2025-07-08 DIAGNOSIS — M25.512 ACUTE PAIN OF LEFT SHOULDER: Primary | ICD-10-CM

## 2025-07-08 PROCEDURE — 99214 OFFICE O/P EST MOD 30 MIN: CPT | Performed by: FAMILY MEDICINE

## 2025-07-08 NOTE — PATIENT INSTRUCTIONS
F/u after MRI  MRI L shoulder-  L shoulder pain/injury/6 wks of PT/XR neg   Continue home exercises  Hold PT for now.  Icing/heat/OTC pain meds as needed.

## 2025-07-08 NOTE — PROGRESS NOTES
[facility] [address] [address]  [phone]  [phone]    Name: Danis Forde      : 1970      MRN: [id number]  Encounter Provider: Troy Cueto MD  Encounter Date: 2025   Encounter department: [facility] [place]  :  Assessment & Plan  Acute pain of left shoulder    Orders:    MRI shoulder left wo contrast; Future    Rotator cuff strain, left, initial encounter    Orders:    MRI shoulder left wo contrast; Future    Pectoralis major tendonitis, left    Orders:    MRI shoulder left wo contrast; Future        Assessment:  1. Acute pain of left shoulder  -     MRI shoulder left wo contrast; Future; Expected date: 2025  2. Rotator cuff strain, left, initial encounter  -     MRI shoulder left wo contrast; Future; Expected date: 2025  3. Pectoralis major tendonitis, left  -     MRI shoulder left wo contrast; Future; Expected date: 2025    Assessment & Plan    Patient Instructions   F/u after MRI  MRI L shoulder-  L shoulder pain/injury/6 wks of PT/XR neg   Continue home exercises  Hold PT for now.  Icing/heat/OTC pain meds as needed.    Plan:  Patient Instructions   F/u after MRI  MRI L shoulder-  L shoulder pain/injury/6 wks of PT/XR neg   Continue home exercises  Hold PT for now.  Icing/heat/OTC pain meds as needed.     Return for f/u after MRI L shoulder, Recheck.    Chief Complaint:  Chief Complaint   Patient presents with    Left Shoulder - Follow-up       Subjective:   HPI    Patient ID: Danis Forde is a 54 y.o. male     Here for f/u  L shoulder pain  Still having pain - some improvement from beginning but now has hit plateau  Pain reaching up intermittently   Worse with reaching/stretching forward at times  Going to PT  Sharp pain  celebrex- Prn- helps a little  Achey at times    Review of Systems   Constitutional:  Negative for fatigue and fever.   Respiratory:  Negative for shortness of  breath.    Cardiovascular:  Negative for chest pain.   Gastrointestinal:  Negative for abdominal pain and nausea.   Genitourinary:  Negative for dysuria.   Musculoskeletal:  Positive for arthralgias.   Skin:  Negative for rash and wound.   Neurological:  Negative for weakness and headaches.       Objective:  Objective   Pulse 84   Temp 97.9 °F (36.6 °C) (Tympanic)   Ht 6' (1.829 m)   Wt 106 kg (234 lb 9.6 oz)   SpO2 97%   BMI 31.82 kg/m²     Vitals:  Pulse 84   Temp 97.9 °F (36.6 °C) (Tympanic)   Ht 6' (1.829 m)   Wt 106 kg (234 lb 9.6 oz)   SpO2 97%   BMI 31.82 kg/m²     The following portions of the patient's history were reviewed and updated as appropriate: allergies, current medications, past family history, past medical history, past social history, past surgical history, and problem list.    Physical exam:  Physical Exam  Constitutional:       Appearance: Normal appearance. He is normal weight.     Eyes:      Extraocular Movements: Extraocular movements intact.     Pulmonary:      Effort: Pulmonary effort is normal.     Musculoskeletal:      Cervical back: Normal range of motion.     Skin:     General: Skin is warm and dry.     Neurological:      General: No focal deficit present.      Mental Status: He is alert and oriented to person, place, and time. Mental status is at baseline.     Psychiatric:         Mood and Affect: Mood normal.         Behavior: Behavior normal.         Thought Content: Thought content normal.         Judgment: Judgment normal.       Left Shoulder Exam     Tenderness   The patient is experiencing tenderness in the biceps tendon.    Range of Motion   Active abduction:  abnormal   Passive abduction:  normal   Extension:  normal   External rotation:  normal   Forward flexion:  normal   Internal rotation 0 degrees:  normal   Internal rotation 90 degrees:  normal     Muscle Strength   The patient has normal left shoulder strength.    Tests   Chi test: positive     Comments:   Pain with resistance to shoulder abduction                  Troy Cueto MD

## 2025-07-10 RX ORDER — CELECOXIB 200 MG/1
200 CAPSULE ORAL DAILY
Qty: 90 CAPSULE | Refills: 0 | Status: SHIPPED | OUTPATIENT
Start: 2025-07-10

## 2025-07-18 ENCOUNTER — HOSPITAL ENCOUNTER (OUTPATIENT)
Dept: MRI IMAGING | Facility: HOSPITAL | Age: 55
End: 2025-07-18
Attending: FAMILY MEDICINE
Payer: COMMERCIAL

## 2025-07-18 DIAGNOSIS — M75.82 PECTORALIS MAJOR TENDONITIS, LEFT: ICD-10-CM

## 2025-07-18 DIAGNOSIS — S46.012A ROTATOR CUFF STRAIN, LEFT, INITIAL ENCOUNTER: ICD-10-CM

## 2025-07-18 DIAGNOSIS — M25.512 ACUTE PAIN OF LEFT SHOULDER: ICD-10-CM

## 2025-07-18 PROCEDURE — 73221 MRI JOINT UPR EXTREM W/O DYE: CPT

## 2025-07-29 ENCOUNTER — OFFICE VISIT (OUTPATIENT)
Dept: OBGYN CLINIC | Facility: CLINIC | Age: 55
End: 2025-07-29
Payer: COMMERCIAL

## 2025-07-29 VITALS
WEIGHT: 232 LBS | HEIGHT: 72 IN | BODY MASS INDEX: 31.42 KG/M2 | OXYGEN SATURATION: 95 % | TEMPERATURE: 97.6 F | HEART RATE: 91 BPM

## 2025-07-29 DIAGNOSIS — S46.012A ROTATOR CUFF STRAIN, LEFT, INITIAL ENCOUNTER: ICD-10-CM

## 2025-07-29 DIAGNOSIS — M25.512 ACUTE PAIN OF LEFT SHOULDER: Primary | ICD-10-CM

## 2025-07-29 DIAGNOSIS — M75.82 PECTORALIS MAJOR TENDONITIS, LEFT: ICD-10-CM

## 2025-07-29 PROCEDURE — 99213 OFFICE O/P EST LOW 20 MIN: CPT | Performed by: FAMILY MEDICINE
